# Patient Record
Sex: MALE | Race: WHITE
[De-identification: names, ages, dates, MRNs, and addresses within clinical notes are randomized per-mention and may not be internally consistent; named-entity substitution may affect disease eponyms.]

---

## 2017-04-03 ENCOUNTER — HOSPITAL ENCOUNTER (OUTPATIENT)
Dept: HOSPITAL 83 - RAD | Age: 77
Discharge: HOME | End: 2017-04-03
Attending: CHIROPRACTOR
Payer: MEDICARE

## 2017-04-03 DIAGNOSIS — M54.9: ICD-10-CM

## 2017-04-03 DIAGNOSIS — Z85.118: ICD-10-CM

## 2017-04-03 DIAGNOSIS — J44.9: ICD-10-CM

## 2017-04-03 DIAGNOSIS — M54.6: Primary | ICD-10-CM

## 2017-04-03 DIAGNOSIS — G89.29: ICD-10-CM

## 2017-04-03 DIAGNOSIS — M43.8X2: ICD-10-CM

## 2018-05-19 ENCOUNTER — HOSPITAL ENCOUNTER (EMERGENCY)
Dept: HOSPITAL 83 - ED | Age: 78
Discharge: HOME | End: 2018-05-19
Payer: COMMERCIAL

## 2018-05-19 VITALS — DIASTOLIC BLOOD PRESSURE: 91 MMHG

## 2018-05-19 VITALS — HEIGHT: 73.98 IN | BODY MASS INDEX: 21.17 KG/M2 | WEIGHT: 165 LBS

## 2018-05-19 DIAGNOSIS — Z79.899: ICD-10-CM
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
## 2018-05-19 DIAGNOSIS — Z79.82: ICD-10-CM

## 2018-05-19 DIAGNOSIS — Z88.1: ICD-10-CM
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
## 2018-05-19 DIAGNOSIS — Z88.8: ICD-10-CM
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
## 2018-05-19 DIAGNOSIS — S60.512A: ICD-10-CM

## 2018-05-19 DIAGNOSIS — Z90.89: ICD-10-CM

## 2018-05-19 DIAGNOSIS — S00.81XA: Primary | ICD-10-CM
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
## 2018-09-07 PROBLEM — I71.40 ABDOMINAL AORTIC ANEURYSM (AAA) WITHOUT RUPTURE: Status: ACTIVE | Noted: 2018-09-07

## 2018-09-07 PROBLEM — E78.2 MIXED HYPERLIPIDEMIA: Status: ACTIVE | Noted: 2018-09-07

## 2018-09-07 PROBLEM — I48.0 PAROXYSMAL ATRIAL FIBRILLATION (HCC): Status: ACTIVE | Noted: 2018-09-07

## 2018-09-07 PROBLEM — I10 ESSENTIAL HYPERTENSION: Status: ACTIVE | Noted: 2018-09-07

## 2018-09-07 PROBLEM — J44.9 COPD (CHRONIC OBSTRUCTIVE PULMONARY DISEASE) (HCC): Status: ACTIVE | Noted: 2018-09-07

## 2018-09-13 ENCOUNTER — OFFICE VISIT (OUTPATIENT)
Dept: CARDIOLOGY CLINIC | Age: 78
End: 2018-09-13
Payer: MEDICARE

## 2018-09-13 VITALS
SYSTOLIC BLOOD PRESSURE: 132 MMHG | RESPIRATION RATE: 18 BRPM | WEIGHT: 150.7 LBS | DIASTOLIC BLOOD PRESSURE: 82 MMHG | HEIGHT: 74 IN | BODY MASS INDEX: 19.34 KG/M2 | HEART RATE: 64 BPM

## 2018-09-13 DIAGNOSIS — J44.9 CHRONIC OBSTRUCTIVE PULMONARY DISEASE, UNSPECIFIED COPD TYPE (HCC): ICD-10-CM

## 2018-09-13 DIAGNOSIS — I10 ESSENTIAL HYPERTENSION: ICD-10-CM

## 2018-09-13 DIAGNOSIS — E78.2 MIXED HYPERLIPIDEMIA: ICD-10-CM

## 2018-09-13 DIAGNOSIS — I48.0 PAROXYSMAL ATRIAL FIBRILLATION (HCC): ICD-10-CM

## 2018-09-13 DIAGNOSIS — I45.2 RBBB (RIGHT BUNDLE BRANCH BLOCK WITH LEFT ANTERIOR FASCICULAR BLOCK): ICD-10-CM

## 2018-09-13 DIAGNOSIS — I71.40 ABDOMINAL AORTIC ANEURYSM (AAA) WITHOUT RUPTURE: ICD-10-CM

## 2018-09-13 PROCEDURE — 93000 ELECTROCARDIOGRAM COMPLETE: CPT | Performed by: INTERNAL MEDICINE

## 2018-09-13 PROCEDURE — 99205 OFFICE O/P NEW HI 60 MIN: CPT | Performed by: INTERNAL MEDICINE

## 2018-09-13 PROCEDURE — 1101F PT FALLS ASSESS-DOCD LE1/YR: CPT | Performed by: INTERNAL MEDICINE

## 2018-09-13 PROCEDURE — G8427 DOCREV CUR MEDS BY ELIG CLIN: HCPCS | Performed by: INTERNAL MEDICINE

## 2018-09-13 PROCEDURE — 3023F SPIROM DOC REV: CPT | Performed by: INTERNAL MEDICINE

## 2018-09-13 PROCEDURE — 1036F TOBACCO NON-USER: CPT | Performed by: INTERNAL MEDICINE

## 2018-09-13 PROCEDURE — G8926 SPIRO NO PERF OR DOC: HCPCS | Performed by: INTERNAL MEDICINE

## 2018-09-13 PROCEDURE — 4040F PNEUMOC VAC/ADMIN/RCVD: CPT | Performed by: INTERNAL MEDICINE

## 2018-09-13 PROCEDURE — 1123F ACP DISCUSS/DSCN MKR DOCD: CPT | Performed by: INTERNAL MEDICINE

## 2018-09-13 PROCEDURE — G8420 CALC BMI NORM PARAMETERS: HCPCS | Performed by: INTERNAL MEDICINE

## 2018-09-13 RX ORDER — METHYLPREDNISOLONE 4 MG/1
TABLET ORAL
Refills: 2 | Status: ON HOLD | COMMUNITY
Start: 2018-09-06 | End: 2019-08-14 | Stop reason: HOSPADM

## 2018-09-13 RX ORDER — MELOXICAM 15 MG/1
15 TABLET ORAL DAILY
COMMUNITY
End: 2019-12-23

## 2018-09-13 RX ORDER — ALENDRONATE SODIUM 70 MG/1
70 TABLET ORAL
Refills: 1 | COMMUNITY
Start: 2018-08-18 | End: 2022-01-01

## 2018-09-13 RX ORDER — AZITHROMYCIN 250 MG/1
TABLET, FILM COATED ORAL
Refills: 2 | COMMUNITY
Start: 2018-09-06 | End: 2019-02-20

## 2018-09-13 RX ORDER — IPRATROPIUM BROMIDE AND ALBUTEROL SULFATE 2.5; .5 MG/3ML; MG/3ML
SOLUTION RESPIRATORY (INHALATION)
Refills: 5 | COMMUNITY
Start: 2018-09-05 | End: 2019-12-23

## 2018-09-13 RX ORDER — SIMVASTATIN 20 MG
20 TABLET ORAL NIGHTLY
Status: ON HOLD | COMMUNITY
End: 2019-08-14 | Stop reason: HOSPADM

## 2018-09-13 RX ORDER — ALBUTEROL SULFATE 2.5 MG/3ML
2.5 SOLUTION RESPIRATORY (INHALATION) EVERY 6 HOURS PRN
COMMUNITY
End: 2019-09-19

## 2018-09-13 RX ORDER — BUDESONIDE 0.5 MG/2ML
INHALANT ORAL
Refills: 5 | COMMUNITY
Start: 2018-09-05 | End: 2019-09-19

## 2018-09-18 ENCOUNTER — HOSPITAL ENCOUNTER (OUTPATIENT)
Age: 78
Discharge: HOME | End: 2018-09-18
Payer: MEDICARE

## 2018-09-18 PROCEDURE — 86606 ASPERGILLUS ANTIBODY: CPT

## 2018-09-21 ENCOUNTER — HOSPITAL ENCOUNTER (OUTPATIENT)
Dept: CARDIOLOGY | Age: 78
Discharge: HOME | End: 2018-09-21
Payer: MEDICARE

## 2018-09-21 DIAGNOSIS — I45.2 RBBB (RIGHT BUNDLE BRANCH BLOCK WITH LEFT ANTERIOR FASCICULAR BLOCK): ICD-10-CM

## 2018-09-21 DIAGNOSIS — I10 ESSENTIAL HYPERTENSION: ICD-10-CM

## 2018-09-21 DIAGNOSIS — I48.0 PAROXYSMAL ATRIAL FIBRILLATION (HCC): ICD-10-CM

## 2018-09-21 DIAGNOSIS — J44.9 CHRONIC OBSTRUCTIVE PULMONARY DISEASE, UNSPECIFIED COPD TYPE (HCC): ICD-10-CM

## 2018-09-21 LAB
LV EF: 55 %
LVEF MODALITY: NORMAL

## 2018-09-21 PROCEDURE — 93306 TTE W/DOPPLER COMPLETE: CPT

## 2018-09-24 ENCOUNTER — TELEPHONE (OUTPATIENT)
Dept: CARDIOLOGY CLINIC | Age: 78
End: 2018-09-24

## 2018-09-24 DIAGNOSIS — R07.2 PRECORDIAL PAIN: Primary | ICD-10-CM

## 2018-09-28 ENCOUNTER — HOSPITAL ENCOUNTER (OUTPATIENT)
Dept: CARDIOLOGY | Age: 78
Discharge: HOME | End: 2018-09-28
Payer: MEDICARE

## 2018-09-28 ENCOUNTER — TELEPHONE (OUTPATIENT)
Dept: CARDIOLOGY CLINIC | Age: 78
End: 2018-09-28

## 2018-09-28 VITALS
BODY MASS INDEX: 18.87 KG/M2 | SYSTOLIC BLOOD PRESSURE: 140 MMHG | WEIGHT: 147 LBS | HEIGHT: 74 IN | HEART RATE: 74 BPM | DIASTOLIC BLOOD PRESSURE: 74 MMHG

## 2018-09-28 DIAGNOSIS — R07.9 CHEST PAIN, UNSPECIFIED TYPE: Primary | ICD-10-CM

## 2018-09-28 DIAGNOSIS — I25.10 CORONARY ARTERY DISEASE INVOLVING NATIVE CORONARY ARTERY OF NATIVE HEART WITHOUT ANGINA PECTORIS: ICD-10-CM

## 2018-09-28 DIAGNOSIS — R07.2 PRECORDIAL PAIN: ICD-10-CM

## 2018-09-28 PROCEDURE — A9500 TC99M SESTAMIBI: HCPCS | Performed by: INTERNAL MEDICINE

## 2018-09-28 PROCEDURE — 2580000003 HC RX 258: Performed by: INTERNAL MEDICINE

## 2018-09-28 PROCEDURE — 93017 CV STRESS TEST TRACING ONLY: CPT

## 2018-09-28 PROCEDURE — 3430000000 HC RX DIAGNOSTIC RADIOPHARMACEUTICAL: Performed by: INTERNAL MEDICINE

## 2018-09-28 PROCEDURE — 78452 HT MUSCLE IMAGE SPECT MULT: CPT

## 2018-09-28 PROCEDURE — 6360000002 HC RX W HCPCS: Performed by: INTERNAL MEDICINE

## 2018-09-28 RX ORDER — SODIUM CHLORIDE 0.9 % (FLUSH) 0.9 %
10 SYRINGE (ML) INJECTION PRN
Status: DISCONTINUED | OUTPATIENT
Start: 2018-09-28 | End: 2018-09-29 | Stop reason: HOSPADM

## 2018-09-28 RX ADMIN — Medication 28.9 MILLICURIE: at 11:10

## 2018-09-28 RX ADMIN — Medication 10.2 MILLICURIE: at 09:21

## 2018-09-28 RX ADMIN — REGADENOSON 0.4 MG: 0.08 INJECTION, SOLUTION INTRAVENOUS at 11:10

## 2018-09-28 RX ADMIN — Medication 10 ML: at 11:10

## 2018-09-28 RX ADMIN — Medication 10 ML: at 09:21

## 2018-09-28 RX ADMIN — Medication 10 ML: at 11:11

## 2018-09-28 NOTE — TELEPHONE ENCOUNTER
Patients wife notified and instructed on stress test results and follow-up as stated by Dr. Roya Feliciano

## 2018-09-28 NOTE — PROCEDURES
[address],[place] [address] [address]  [phone]                Pharmacologic Stress Nuclear Gated SPECT Study    Name: [facility] Account Number: [de-identified]    :  1940          Sex: male         Date of Study:  2018    Height: 6' 2\" (188 cm)         Weight: 147 lb (66.7 kg)     Ordering Provider: Gary Bailon          PCP: Maxine Sandoval MD      Cardiologist: Gary Bailon             Interpreting Physician: Gary Bailon  _________________________________________________________________________________    Indication:   Evaluation of extent and severity of coronary artery disease    Clinical History:   Patient has prior history of coronary artery disease. Procedure:   Pharmacologic stress testing was performed with regadenoson 0.4 mg for 15 seconds. The heart rate was 66 at baseline and dimitri to 92 beats during the infusion. This corresponds to 65% of maximum predicted heart rate. The blood pressure at baseline was 140/80 and blood pressure at the end of infusion was 140/80. Blood pressure response was normal during the stress procedure. The patient experienced mild shortness of breath during the infusion, which resolved. ECG during the infusion did not change. IMAGING: Myocardial perfusion imaging was performed at rest 30-35 minutes following the intravenous injection of 10.2 mCi of (Tc-Sestamibi) followed by 10 ml of Normal Saline. As per infusion protocol, the patient was injected intravenously with 28.9 mCi of (Tc-Sestamibi) followed by 10 ml of Normal Saline. Gated post-stress tomographic imaging was performed 20-25 minutes after stress. FINDINGS: The overall quality of the study was good. Left ventricular cavity size was noted to be normal.    Rotational analog analysis demonstrated soft tissue diaphragmatic attenuation.     The gated SPECT stress imaging in the short, vertical long, and horizontal long axis demonstrated normal homogeneous tracer distribution throughout the myocardium, apart from artifact described above. Gated SPECT left ventricular ejection fraction was calculated to be 63%, with normal myocardial thickening and wall motion. Impression:    1. ECG during the infusion did not change. 2. The myocardial perfusion imaging was normal with attenuation artifact. 3. Overall left ventricular systolic function was normal without regional wall motion abnormalities. 4. Low risk general pharmacologic nuclear stress test.    Thank you for sending your patient to this [employer].      Electronically signed by Deanna Nicholson MD on 9/28/18 at 12:06 PM

## 2018-09-29 LAB
Lab: NORMAL
REPORT: NORMAL
THIS TEST SENT TO: NORMAL

## 2018-11-12 ENCOUNTER — HOSPITAL ENCOUNTER (OUTPATIENT)
Age: 78
Discharge: HOME | End: 2018-11-12
Payer: MEDICARE

## 2018-11-12 PROCEDURE — 87385 HISTOPLASMA CAPSUL AG IA: CPT

## 2018-11-13 ENCOUNTER — HOSPITAL ENCOUNTER (OUTPATIENT)
Age: 78
Discharge: HOME | End: 2018-11-13
Payer: MEDICARE

## 2018-11-13 LAB
ALBUMIN SERPL-MCNC: 4 G/DL (ref 3.5–5.2)
ALP BLD-CCNC: 125 U/L (ref 40–129)
ALT SERPL-CCNC: 25 U/L (ref 0–40)
ANION GAP SERPL CALCULATED.3IONS-SCNC: 10 MMOL/L (ref 7–16)
AST SERPL-CCNC: 22 U/L (ref 0–39)
BASOPHILS ABSOLUTE: 0.1 E9/L (ref 0–0.2)
BASOPHILS RELATIVE PERCENT: 0.8 % (ref 0–2)
BILIRUB SERPL-MCNC: 0.4 MG/DL (ref 0–1.2)
BUN BLDV-MCNC: 26 MG/DL (ref 8–23)
C-REACTIVE PROTEIN: 4.7 MG/DL (ref 0–0.4)
CALCIUM SERPL-MCNC: 9.3 MG/DL (ref 8.6–10.2)
CHLORIDE BLD-SCNC: 110 MMOL/L (ref 98–107)
CO2: 27 MMOL/L (ref 22–29)
CREAT SERPL-MCNC: 1.1 MG/DL (ref 0.7–1.2)
EOSINOPHILS ABSOLUTE: 0.4 E9/L (ref 0.05–0.5)
EOSINOPHILS RELATIVE PERCENT: 3.3 % (ref 0–6)
GFR AFRICAN AMERICAN: >60
GFR NON-AFRICAN AMERICAN: >60 ML/MIN/1.73
GLUCOSE BLD-MCNC: 97 MG/DL (ref 74–99)
HCT VFR BLD CALC: 41.7 % (ref 37–54)
HEMOGLOBIN: 13 G/DL (ref 12.5–16.5)
IMMATURE GRANULOCYTES #: 0.05 E9/L
IMMATURE GRANULOCYTES %: 0.4 % (ref 0–5)
LYMPHOCYTES ABSOLUTE: 1.08 E9/L (ref 1.5–4)
LYMPHOCYTES RELATIVE PERCENT: 9 % (ref 20–42)
MCH RBC QN AUTO: 28.4 PG (ref 26–35)
MCHC RBC AUTO-ENTMCNC: 31.2 % (ref 32–34.5)
MCV RBC AUTO: 91 FL (ref 80–99.9)
MONOCYTES ABSOLUTE: 0.72 E9/L (ref 0.1–0.95)
MONOCYTES RELATIVE PERCENT: 6 % (ref 2–12)
NEUTROPHILS ABSOLUTE: 9.64 E9/L (ref 1.8–7.3)
NEUTROPHILS RELATIVE PERCENT: 80.5 % (ref 43–80)
PDW BLD-RTO: 14.5 FL (ref 11.5–15)
PLATELET # BLD: 284 E9/L (ref 130–450)
PMV BLD AUTO: 10.7 FL (ref 7–12)
POTASSIUM SERPL-SCNC: 4.7 MMOL/L (ref 3.5–5)
RBC # BLD: 4.58 E12/L (ref 3.8–5.8)
SEDIMENTATION RATE, ERYTHROCYTE: 61 MM/HR (ref 0–15)
SODIUM BLD-SCNC: 147 MMOL/L (ref 132–146)
TOTAL PROTEIN: 7.4 G/DL (ref 6.4–8.3)
WBC # BLD: 12 E9/L (ref 4.5–11.5)

## 2018-11-13 PROCEDURE — 36415 COLL VENOUS BLD VENIPUNCTURE: CPT

## 2018-11-13 PROCEDURE — 82784 ASSAY IGA/IGD/IGG/IGM EACH: CPT

## 2018-11-13 PROCEDURE — 85025 COMPLETE CBC W/AUTO DIFF WBC: CPT

## 2018-11-13 PROCEDURE — 87305 ASPERGILLUS AG IA: CPT

## 2018-11-13 PROCEDURE — 86481 TB AG RESPONSE T-CELL SUSP: CPT

## 2018-11-13 PROCEDURE — 82785 ASSAY OF IGE: CPT

## 2018-11-13 PROCEDURE — 87449 NOS EACH ORGANISM AG IA: CPT

## 2018-11-13 PROCEDURE — 86140 C-REACTIVE PROTEIN: CPT

## 2018-11-13 PROCEDURE — 80053 COMPREHEN METABOLIC PANEL: CPT

## 2018-11-13 PROCEDURE — 85651 RBC SED RATE NONAUTOMATED: CPT

## 2018-11-13 PROCEDURE — 82787 IGG 1 2 3 OR 4 EACH: CPT

## 2018-11-14 LAB
IGA: 293 MG/DL (ref 70–400)
IGG: 1201 MG/DL (ref 700–1600)
IGM: 108 MG/DL (ref 40–230)

## 2018-11-15 LAB
(1,3)-BETA-D-GLUCAN (FUNGITELL) INTERPRETATION: NEGATIVE
(1,3)-BETA-D-GLUCAN (FUNGITELL): <31 PG/ML

## 2018-11-16 LAB
IGG 1: 645 MG/DL (ref 240–1118)
IGG 2: 403 MG/DL (ref 124–549)
IGG 3: 169 MG/DL (ref 21–134)
IGG 4: 71 MG/DL (ref 1–123)

## 2018-11-17 LAB
ASPERGILLUS GALACTO AG: POSITIVE
ASPERGILLUS GALACTO INDEX: 0.73
IGE: 121 KU/L

## 2018-11-21 LAB
Lab: NORMAL
REPORT: NORMAL
THIS TEST SENT TO: NORMAL

## 2018-12-10 LAB
COMMENT: NORMAL
REPORT: NORMAL

## 2019-01-02 ENCOUNTER — HOSPITAL ENCOUNTER (OUTPATIENT)
Dept: GENERAL RADIOLOGY | Age: 79
Discharge: HOME | End: 2019-01-04
Payer: MEDICARE

## 2019-01-02 ENCOUNTER — HOSPITAL ENCOUNTER (OUTPATIENT)
Age: 79
Discharge: HOME | End: 2019-01-04
Payer: MEDICARE

## 2019-01-02 DIAGNOSIS — B49 FUNGAL INFECTION OF LUNG: ICD-10-CM

## 2019-01-02 PROCEDURE — 71046 X-RAY EXAM CHEST 2 VIEWS: CPT

## 2019-02-20 RX ORDER — TAMSULOSIN HYDROCHLORIDE 0.4 MG/1
0.4 CAPSULE ORAL NIGHTLY
COMMUNITY

## 2019-02-22 ENCOUNTER — HOSPITAL ENCOUNTER (OUTPATIENT)
Age: 79
Setting detail: OUTPATIENT SURGERY
Discharge: HOME | End: 2019-02-22
Attending: INTERNAL MEDICINE | Admitting: INTERNAL MEDICINE
Payer: MEDICARE

## 2019-02-22 ENCOUNTER — ANESTHESIA (OUTPATIENT)
Dept: ENDOSCOPY | Age: 79
End: 2019-02-22
Payer: MEDICARE

## 2019-02-22 ENCOUNTER — ANESTHESIA EVENT (OUTPATIENT)
Dept: ENDOSCOPY | Age: 79
End: 2019-02-22
Payer: MEDICARE

## 2019-02-22 VITALS
TEMPERATURE: 97.7 F | OXYGEN SATURATION: 92 % | SYSTOLIC BLOOD PRESSURE: 150 MMHG | BODY MASS INDEX: 17.58 KG/M2 | HEIGHT: 74 IN | DIASTOLIC BLOOD PRESSURE: 88 MMHG | HEART RATE: 80 BPM | RESPIRATION RATE: 18 BRPM | WEIGHT: 137 LBS

## 2019-02-22 VITALS — SYSTOLIC BLOOD PRESSURE: 143 MMHG | DIASTOLIC BLOOD PRESSURE: 82 MMHG | OXYGEN SATURATION: 92 %

## 2019-02-22 PROCEDURE — 87077 CULTURE AEROBIC IDENTIFY: CPT

## 2019-02-22 PROCEDURE — 3700000000 HC ANESTHESIA ATTENDED CARE: Performed by: INTERNAL MEDICINE

## 2019-02-22 PROCEDURE — 2580000003 HC RX 258: Performed by: NURSE ANESTHETIST, CERTIFIED REGISTERED

## 2019-02-22 PROCEDURE — 7100000010 HC PHASE II RECOVERY - FIRST 15 MIN: Performed by: INTERNAL MEDICINE

## 2019-02-22 PROCEDURE — 87116 MYCOBACTERIA CULTURE: CPT

## 2019-02-22 PROCEDURE — 3609027000 HC BRONCHOSCOPY: Performed by: INTERNAL MEDICINE

## 2019-02-22 PROCEDURE — 87070 CULTURE OTHR SPECIMN AEROBIC: CPT

## 2019-02-22 PROCEDURE — 87102 FUNGUS ISOLATION CULTURE: CPT

## 2019-02-22 PROCEDURE — 7100000011 HC PHASE II RECOVERY - ADDTL 15 MIN: Performed by: INTERNAL MEDICINE

## 2019-02-22 PROCEDURE — 87186 SC STD MICRODIL/AGAR DIL: CPT

## 2019-02-22 PROCEDURE — 6370000000 HC RX 637 (ALT 250 FOR IP): Performed by: INTERNAL MEDICINE

## 2019-02-22 PROCEDURE — 87015 SPECIMEN INFECT AGNT CONCNTJ: CPT

## 2019-02-22 PROCEDURE — 87205 SMEAR GRAM STAIN: CPT

## 2019-02-22 PROCEDURE — 6360000002 HC RX W HCPCS: Performed by: NURSE ANESTHETIST, CERTIFIED REGISTERED

## 2019-02-22 PROCEDURE — 87206 SMEAR FLUORESCENT/ACID STAI: CPT

## 2019-02-22 PROCEDURE — 3700000001 HC ADD 15 MINUTES (ANESTHESIA): Performed by: INTERNAL MEDICINE

## 2019-02-22 RX ORDER — PROPOFOL 10 MG/ML
INJECTION, EMULSION INTRAVENOUS PRN
Status: DISCONTINUED | OUTPATIENT
Start: 2019-02-22 | End: 2019-02-22 | Stop reason: SDUPTHER

## 2019-02-22 RX ORDER — SODIUM CHLORIDE 9 MG/ML
INJECTION, SOLUTION INTRAVENOUS CONTINUOUS PRN
Status: DISCONTINUED | OUTPATIENT
Start: 2019-02-22 | End: 2019-02-22 | Stop reason: SDUPTHER

## 2019-02-22 RX ADMIN — SODIUM CHLORIDE: 9 INJECTION, SOLUTION INTRAVENOUS at 10:45

## 2019-02-22 RX ADMIN — PROPOFOL 150 MG: 10 INJECTION, EMULSION INTRAVENOUS at 11:11

## 2019-02-22 ASSESSMENT — ENCOUNTER SYMPTOMS: SHORTNESS OF BREATH: 1

## 2019-02-22 ASSESSMENT — PAIN SCALES - GENERAL: PAINLEVEL_OUTOF10: 0

## 2019-02-22 ASSESSMENT — PAIN - FUNCTIONAL ASSESSMENT: PAIN_FUNCTIONAL_ASSESSMENT: 0-10

## 2019-02-23 LAB — GRAM STAIN ORDERABLE: NORMAL

## 2019-02-27 LAB
CULTURE, RESPIRATORY: ABNORMAL
CULTURE, RESPIRATORY: ABNORMAL
ORGANISM: ABNORMAL
SMEAR, RESPIRATORY: ABNORMAL

## 2019-03-06 ENCOUNTER — HOSPITAL ENCOUNTER (OUTPATIENT)
Dept: CT IMAGING | Age: 79
Discharge: HOME | End: 2019-03-08
Payer: MEDICARE

## 2019-03-06 DIAGNOSIS — B44.9 ASPERGILLOMA (HCC): ICD-10-CM

## 2019-03-06 PROCEDURE — 71250 CT THORAX DX C-: CPT

## 2019-04-01 LAB
FUNGUS (MYCOLOGY) CULTURE: NORMAL
FUNGUS STAIN: NORMAL

## 2019-04-02 PROBLEM — B44.9 ASPERGILLOSIS, WITH PNEUMONIA (HCC): Status: ACTIVE | Noted: 2019-04-02

## 2019-04-16 LAB
AFB CULTURE (MYCOBACTERIA): NORMAL
AFB SMEAR: NORMAL

## 2019-04-29 ENCOUNTER — HOSPITAL ENCOUNTER (OUTPATIENT)
Age: 79
Discharge: HOME | End: 2019-04-29
Payer: MEDICARE

## 2019-04-29 LAB
ALBUMIN SERPL-MCNC: 4.1 G/DL (ref 3.5–5.2)
ALP BLD-CCNC: 140 U/L (ref 40–129)
ALT SERPL-CCNC: 17 U/L (ref 0–40)
ANION GAP SERPL CALCULATED.3IONS-SCNC: 11 MMOL/L (ref 7–16)
AST SERPL-CCNC: 25 U/L (ref 0–39)
BASOPHILS ABSOLUTE: 0.06 E9/L (ref 0–0.2)
BASOPHILS RELATIVE PERCENT: 0.5 % (ref 0–2)
BILIRUB SERPL-MCNC: 0.2 MG/DL (ref 0–1.2)
BUN BLDV-MCNC: 24 MG/DL (ref 8–23)
C-REACTIVE PROTEIN: 5.6 MG/DL (ref 0–0.4)
CALCIUM SERPL-MCNC: 9.2 MG/DL (ref 8.6–10.2)
CHLORIDE BLD-SCNC: 110 MMOL/L (ref 98–107)
CO2: 25 MMOL/L (ref 22–29)
CREAT SERPL-MCNC: 1.1 MG/DL (ref 0.7–1.2)
EOSINOPHILS ABSOLUTE: 0.38 E9/L (ref 0.05–0.5)
EOSINOPHILS RELATIVE PERCENT: 3.3 % (ref 0–6)
GFR AFRICAN AMERICAN: >60
GFR NON-AFRICAN AMERICAN: >60 ML/MIN/1.73
GLUCOSE BLD-MCNC: 85 MG/DL (ref 74–99)
HCT VFR BLD CALC: 42.2 % (ref 37–54)
HEMOGLOBIN: 13.2 G/DL (ref 12.5–16.5)
IMMATURE GRANULOCYTES #: 0.08 E9/L
IMMATURE GRANULOCYTES %: 0.7 % (ref 0–5)
LYMPHOCYTES ABSOLUTE: 1.54 E9/L (ref 1.5–4)
LYMPHOCYTES RELATIVE PERCENT: 13.3 % (ref 20–42)
MCH RBC QN AUTO: 29.1 PG (ref 26–35)
MCHC RBC AUTO-ENTMCNC: 31.3 % (ref 32–34.5)
MCV RBC AUTO: 93.2 FL (ref 80–99.9)
MONOCYTES ABSOLUTE: 1.01 E9/L (ref 0.1–0.95)
MONOCYTES RELATIVE PERCENT: 8.7 % (ref 2–12)
NEUTROPHILS ABSOLUTE: 8.52 E9/L (ref 1.8–7.3)
NEUTROPHILS RELATIVE PERCENT: 73.5 % (ref 43–80)
PDW BLD-RTO: 15.9 FL (ref 11.5–15)
PLATELET # BLD: 324 E9/L (ref 130–450)
PMV BLD AUTO: 10.5 FL (ref 7–12)
POTASSIUM SERPL-SCNC: 4.4 MMOL/L (ref 3.5–5)
RBC # BLD: 4.53 E12/L (ref 3.8–5.8)
SEDIMENTATION RATE, ERYTHROCYTE: 30 MM/HR (ref 0–15)
SODIUM BLD-SCNC: 146 MMOL/L (ref 132–146)
TOTAL PROTEIN: 7.1 G/DL (ref 6.4–8.3)
WBC # BLD: 11.6 E9/L (ref 4.5–11.5)

## 2019-04-29 PROCEDURE — 87449 NOS EACH ORGANISM AG IA: CPT

## 2019-04-29 PROCEDURE — 85025 COMPLETE CBC W/AUTO DIFF WBC: CPT

## 2019-04-29 PROCEDURE — 36415 COLL VENOUS BLD VENIPUNCTURE: CPT

## 2019-04-29 PROCEDURE — 80053 COMPREHEN METABOLIC PANEL: CPT

## 2019-04-29 PROCEDURE — 86140 C-REACTIVE PROTEIN: CPT

## 2019-04-29 PROCEDURE — 87305 ASPERGILLUS AG IA: CPT

## 2019-04-29 PROCEDURE — 80299 QUANTITATIVE ASSAY DRUG: CPT

## 2019-04-29 PROCEDURE — 85651 RBC SED RATE NONAUTOMATED: CPT

## 2019-05-01 LAB
(1,3)-BETA-D-GLUCAN (FUNGITELL) INTERPRETATION: NEGATIVE
(1,3)-BETA-D-GLUCAN (FUNGITELL): <31 PG/ML
ASPERGILLUS GALACTO AG: NEGATIVE
ASPERGILLUS GALACTO INDEX: 0.04

## 2019-05-03 LAB
Lab: NORMAL
REPORT: NORMAL
THIS TEST SENT TO: NORMAL

## 2019-05-04 ENCOUNTER — APPOINTMENT (OUTPATIENT)
Dept: GENERAL RADIOLOGY | Age: 79
End: 2019-05-04
Payer: MEDICARE

## 2019-05-04 ENCOUNTER — APPOINTMENT (OUTPATIENT)
Dept: CT IMAGING | Age: 79
End: 2019-05-04
Payer: MEDICARE

## 2019-05-04 ENCOUNTER — HOSPITAL ENCOUNTER (EMERGENCY)
Age: 79
Discharge: HOME | End: 2019-05-04
Attending: EMERGENCY MEDICINE
Payer: MEDICARE

## 2019-05-04 VITALS
HEIGHT: 74 IN | HEART RATE: 82 BPM | WEIGHT: 141 LBS | OXYGEN SATURATION: 94 % | TEMPERATURE: 97.1 F | DIASTOLIC BLOOD PRESSURE: 84 MMHG | BODY MASS INDEX: 18.1 KG/M2 | RESPIRATION RATE: 18 BRPM | SYSTOLIC BLOOD PRESSURE: 158 MMHG

## 2019-05-04 DIAGNOSIS — S39.012A BACK STRAIN, INITIAL ENCOUNTER: Primary | ICD-10-CM

## 2019-05-04 LAB
ANION GAP SERPL CALCULATED.3IONS-SCNC: 11 MMOL/L (ref 7–16)
ANION GAP SERPL CALCULATED.3IONS-SCNC: 11 MMOL/L (ref 7–16)
APTT: 24.4 SEC (ref 24.5–35.1)
BASOPHILS ABSOLUTE: 0.03 E9/L (ref 0–0.2)
BASOPHILS RELATIVE PERCENT: 0.2 % (ref 0–2)
BUN BLDV-MCNC: 24 MG/DL (ref 8–23)
BUN BLDV-MCNC: 26 MG/DL (ref 8–23)
CALCIUM SERPL-MCNC: 8.6 MG/DL (ref 8.6–10.2)
CALCIUM SERPL-MCNC: 8.8 MG/DL (ref 8.6–10.2)
CHLORIDE BLD-SCNC: 107 MMOL/L (ref 98–107)
CHLORIDE BLD-SCNC: 109 MMOL/L (ref 98–107)
CO2: 24 MMOL/L (ref 22–29)
CO2: 25 MMOL/L (ref 22–29)
CREAT SERPL-MCNC: 0.9 MG/DL (ref 0.7–1.2)
CREAT SERPL-MCNC: 0.9 MG/DL (ref 0.7–1.2)
EKG ATRIAL RATE: 63 BPM
EKG P AXIS: 62 DEGREES
EKG P-R INTERVAL: 152 MS
EKG Q-T INTERVAL: 450 MS
EKG QRS DURATION: 136 MS
EKG QTC CALCULATION (BAZETT): 460 MS
EKG R AXIS: -81 DEGREES
EKG T AXIS: 17 DEGREES
EKG VENTRICULAR RATE: 63 BPM
EOSINOPHILS ABSOLUTE: 0 E9/L (ref 0.05–0.5)
EOSINOPHILS RELATIVE PERCENT: 0 % (ref 0–6)
GFR AFRICAN AMERICAN: >60
GFR AFRICAN AMERICAN: >60
GFR NON-AFRICAN AMERICAN: >60 ML/MIN/1.73
GFR NON-AFRICAN AMERICAN: >60 ML/MIN/1.73
GLUCOSE BLD-MCNC: 106 MG/DL (ref 74–99)
GLUCOSE BLD-MCNC: 114 MG/DL (ref 74–99)
HCT VFR BLD CALC: 38.1 % (ref 37–54)
HEMOGLOBIN: 11.6 G/DL (ref 12.5–16.5)
IMMATURE GRANULOCYTES #: 0.19 E9/L
IMMATURE GRANULOCYTES %: 1 % (ref 0–5)
INR BLD: 1.1
LACTIC ACID, SEPSIS: 1.1 MMOL/L (ref 0.5–1.9)
LYMPHOCYTES ABSOLUTE: 0.97 E9/L (ref 1.5–4)
LYMPHOCYTES RELATIVE PERCENT: 5 % (ref 20–42)
MCH RBC QN AUTO: 28.4 PG (ref 26–35)
MCHC RBC AUTO-ENTMCNC: 30.4 % (ref 32–34.5)
MCV RBC AUTO: 93.4 FL (ref 80–99.9)
MONOCYTES ABSOLUTE: 0.68 E9/L (ref 0.1–0.95)
MONOCYTES RELATIVE PERCENT: 3.5 % (ref 2–12)
NEUTROPHILS ABSOLUTE: 17.72 E9/L (ref 1.8–7.3)
NEUTROPHILS RELATIVE PERCENT: 90.3 % (ref 43–80)
PDW BLD-RTO: 15.8 FL (ref 11.5–15)
PLATELET # BLD: 325 E9/L (ref 130–450)
PMV BLD AUTO: 11 FL (ref 7–12)
POTASSIUM REFLEX MAGNESIUM: 6 MMOL/L (ref 3.5–5)
POTASSIUM SERPL-SCNC: 4.1 MMOL/L (ref 3.5–5)
PRO-BNP: 2142 PG/ML (ref 0–450)
PROTHROMBIN TIME: 12 SEC (ref 9.3–12.4)
RBC # BLD: 4.08 E12/L (ref 3.8–5.8)
SODIUM BLD-SCNC: 143 MMOL/L (ref 132–146)
SODIUM BLD-SCNC: 144 MMOL/L (ref 132–146)
TROPONIN: <0.01 NG/ML (ref 0–0.03)
WBC # BLD: 19.6 E9/L (ref 4.5–11.5)

## 2019-05-04 PROCEDURE — 2580000003 HC RX 258: Performed by: RADIOLOGY

## 2019-05-04 PROCEDURE — 6370000000 HC RX 637 (ALT 250 FOR IP): Performed by: EMERGENCY MEDICINE

## 2019-05-04 PROCEDURE — 6360000004 HC RX CONTRAST MEDICATION: Performed by: RADIOLOGY

## 2019-05-04 PROCEDURE — 85730 THROMBOPLASTIN TIME PARTIAL: CPT

## 2019-05-04 PROCEDURE — 85610 PROTHROMBIN TIME: CPT

## 2019-05-04 PROCEDURE — 83605 ASSAY OF LACTIC ACID: CPT

## 2019-05-04 PROCEDURE — 85025 COMPLETE CBC W/AUTO DIFF WBC: CPT

## 2019-05-04 PROCEDURE — 93005 ELECTROCARDIOGRAM TRACING: CPT | Performed by: EMERGENCY MEDICINE

## 2019-05-04 PROCEDURE — 71275 CT ANGIOGRAPHY CHEST: CPT

## 2019-05-04 PROCEDURE — 6360000002 HC RX W HCPCS: Performed by: STUDENT IN AN ORGANIZED HEALTH CARE EDUCATION/TRAINING PROGRAM

## 2019-05-04 PROCEDURE — 87040 BLOOD CULTURE FOR BACTERIA: CPT

## 2019-05-04 PROCEDURE — 71046 X-RAY EXAM CHEST 2 VIEWS: CPT

## 2019-05-04 PROCEDURE — 80048 BASIC METABOLIC PNL TOTAL CA: CPT

## 2019-05-04 PROCEDURE — 83880 ASSAY OF NATRIURETIC PEPTIDE: CPT

## 2019-05-04 PROCEDURE — 93010 ELECTROCARDIOGRAM REPORT: CPT | Performed by: INTERNAL MEDICINE

## 2019-05-04 PROCEDURE — 96374 THER/PROPH/DIAG INJ IV PUSH: CPT

## 2019-05-04 PROCEDURE — 99285 EMERGENCY DEPT VISIT HI MDM: CPT

## 2019-05-04 PROCEDURE — 84484 ASSAY OF TROPONIN QUANT: CPT

## 2019-05-04 RX ORDER — HYDROCODONE BITARTRATE AND ACETAMINOPHEN 5; 325 MG/1; MG/1
1 TABLET ORAL EVERY 6 HOURS PRN
Qty: 12 TABLET | Refills: 0 | Status: SHIPPED | OUTPATIENT
Start: 2019-05-04 | End: 2019-05-07

## 2019-05-04 RX ORDER — ACETAMINOPHEN 500 MG
1000 TABLET ORAL ONCE
Status: COMPLETED | OUTPATIENT
Start: 2019-05-04 | End: 2019-05-04

## 2019-05-04 RX ORDER — FENTANYL CITRATE 50 UG/ML
12.5 INJECTION, SOLUTION INTRAMUSCULAR; INTRAVENOUS ONCE
Status: COMPLETED | OUTPATIENT
Start: 2019-05-04 | End: 2019-05-04

## 2019-05-04 RX ORDER — SODIUM CHLORIDE 0.9 % (FLUSH) 0.9 %
10 SYRINGE (ML) INJECTION PRN
Status: COMPLETED | OUTPATIENT
Start: 2019-05-04 | End: 2019-05-04

## 2019-05-04 RX ADMIN — Medication 10 ML: at 17:01

## 2019-05-04 RX ADMIN — IOPAMIDOL 65 ML: 755 INJECTION, SOLUTION INTRAVENOUS at 17:05

## 2019-05-04 RX ADMIN — ACETAMINOPHEN 1000 MG: 500 TABLET ORAL at 16:31

## 2019-05-04 RX ADMIN — FENTANYL CITRATE 12.5 MCG: 50 INJECTION, SOLUTION INTRAMUSCULAR; INTRAVENOUS at 18:07

## 2019-05-04 ASSESSMENT — PAIN DESCRIPTION - PAIN TYPE
TYPE: ACUTE PAIN
TYPE: ACUTE PAIN

## 2019-05-04 ASSESSMENT — PAIN DESCRIPTION - FREQUENCY
FREQUENCY: CONTINUOUS

## 2019-05-04 ASSESSMENT — ENCOUNTER SYMPTOMS
EYE REDNESS: 0
SHORTNESS OF BREATH: 0
BACK PAIN: 1
WHEEZING: 0
CONSTIPATION: 0
SINUS PRESSURE: 0
PHOTOPHOBIA: 0
ABDOMINAL PAIN: 0
DIARRHEA: 0
ABDOMINAL DISTENTION: 0
VOMITING: 0
COUGH: 0
BLOOD IN STOOL: 0
TROUBLE SWALLOWING: 0
SORE THROAT: 0
EYE PAIN: 0
CHEST TIGHTNESS: 0
RHINORRHEA: 0
NAUSEA: 0

## 2019-05-04 ASSESSMENT — PAIN DESCRIPTION - ORIENTATION
ORIENTATION: LEFT

## 2019-05-04 ASSESSMENT — PAIN SCALES - GENERAL
PAINLEVEL_OUTOF10: 4
PAINLEVEL_OUTOF10: 5
PAINLEVEL_OUTOF10: 6
PAINLEVEL_OUTOF10: 7
PAINLEVEL_OUTOF10: 5

## 2019-05-04 ASSESSMENT — PAIN DESCRIPTION - ONSET: ONSET: SUDDEN

## 2019-05-04 ASSESSMENT — PAIN DESCRIPTION - LOCATION
LOCATION: CHEST

## 2019-05-04 ASSESSMENT — PAIN - FUNCTIONAL ASSESSMENT: PAIN_FUNCTIONAL_ASSESSMENT: ACTIVITIES ARE NOT PREVENTED

## 2019-05-04 ASSESSMENT — PAIN DESCRIPTION - DESCRIPTORS
DESCRIPTORS: ACHING
DESCRIPTORS: ACHING

## 2019-05-04 NOTE — ED NOTES
No need to ambulate patient per Dr Samina Sims. OK to discharge.       Naty Christianson RN  05/04/19 [id number]

## 2019-05-04 NOTE — ED PROVIDER NOTES
Exam   Constitutional: He is oriented to person, place, and time. He appears well-developed and well-nourished. No distress. HENT:   Head: Normocephalic and atraumatic. Right Ear: External ear normal.   Left Ear: External ear normal.   Mouth/Throat: Oropharynx is clear and moist. No oropharyngeal exudate. Eyes: Pupils are equal, round, and reactive to light. Conjunctivae and EOM are normal. Right eye exhibits no discharge. Left eye exhibits no discharge. Neck: Normal range of motion. Neck supple. No thyromegaly present. Cardiovascular: Normal rate, regular rhythm and normal heart sounds. No murmur heard. Pulmonary/Chest: Effort normal and breath sounds normal. No respiratory distress. He has no wheezes. He has no rales. He exhibits no tenderness. Abdominal: Soft. He exhibits no distension and no mass. There is no tenderness. There is no rebound and no guarding. Musculoskeletal: Normal range of motion. He exhibits tenderness. Soft tissue texture changes over the left rhomboid with some noted tenderness palpation. Neurological: He is alert and oriented to person, place, and time. Skin: Skin is warm and dry. No rash noted. He is not diaphoretic. Psychiatric: He has a normal mood and affect. His behavior is normal. Judgment and thought content normal.       Procedures    MDM  Number of Diagnoses or Management Options  Back strain, initial encounter:   Diagnosis management comments: Patient is an 26-year-old male who presented to ED for evaluation of pleuritic chest pain. On arrival to ED, physical exam significant for soft tissue texture changes/tenderness to palpation over the left rhomboid with tenderness to palpation over the left anterior chest wall as well. EKG with LAFB and RBBB as noted previously. Labs reviewed-- no leukocytosis, serum chemistry unremarkable, troponin WNL. CTA with no evidence of pulmonary embolism or acute cardiopulmonary process.  Patient was administered analgesic 37.0 - 54.0 %    MCV 93.4 80.0 - 99.9 fL    MCH 28.4 26.0 - 35.0 pg    MCHC 30.4 (L) 32.0 - 34.5 %    RDW 15.8 (H) 11.5 - 15.0 fL    Platelets 690 311 - 180 E9/L    MPV 11.0 7.0 - 12.0 fL    Neutrophils % 90.3 (H) 43.0 - 80.0 %    Immature Granulocytes % 1.0 0.0 - 5.0 %    Lymphocytes % 5.0 (L) 20.0 - 42.0 %    Monocytes % 3.5 2.0 - 12.0 %    Eosinophils % 0.0 0.0 - 6.0 %    Basophils % 0.2 0.0 - 2.0 %    Neutrophils # 17.72 (H) 1.80 - 7.30 E9/L    Immature Granulocytes # 0.19 E9/L    Lymphocytes # 0.97 (L) 1.50 - 4.00 E9/L    Monocytes # 0.68 0.10 - 0.95 E9/L    Eosinophils # 0.00 (L) 0.05 - 0.50 E9/L    Basophils # 0.03 0.00 - 0.20 F6/F   Basic Metabolic Panel w/ Reflex to MG   Result Value Ref Range    Sodium 144 132 - 146 mmol/L    Potassium reflex Magnesium 6.0 (H) 3.5 - 5.0 mmol/L    Chloride 109 (H) 98 - 107 mmol/L    CO2 24 22 - 29 mmol/L    Anion Gap 11 7 - 16 mmol/L    Glucose 106 (H) 74 - 99 mg/dL    BUN 26 (H) 8 - 23 mg/dL    CREATININE 0.9 0.7 - 1.2 mg/dL    GFR Non-African American >60 >=60 mL/min/1.73    GFR African American >60     Calcium 8.6 8.6 - 10.2 mg/dL   Troponin   Result Value Ref Range    Troponin <0.01 0.00 - 0.03 ng/mL   Brain Natriuretic Peptide   Result Value Ref Range    Pro-BNP 2,142 (H) 0 - 450 pg/mL   Protime-INR   Result Value Ref Range    Protime 12.0 9.3 - 12.4 sec    INR 1.1    APTT   Result Value Ref Range    aPTT 24.4 (L) 24.5 - 35.1 sec   Lactate, Sepsis   Result Value Ref Range    Lactic Acid, Sepsis 1.1 0.5 - 1.9 mmol/L   Basic Metabolic Panel   Result Value Ref Range    Sodium 143 132 - 146 mmol/L    Potassium 4.1 3.5 - 5.0 mmol/L    Chloride 107 98 - 107 mmol/L    CO2 25 22 - 29 mmol/L    Anion Gap 11 7 - 16 mmol/L    Glucose 114 (H) 74 - 99 mg/dL    BUN 24 (H) 8 - 23 mg/dL    CREATININE 0.9 0.7 - 1.2 mg/dL    GFR Non-African American >60 >=60 mL/min/1.73    GFR African American >60     Calcium 8.8 8.6 - 10.2 mg/dL   EKG 12 Lead   Result Value Ref Range    Ventricular here for re evaluation. They will followup with their primary care physician by calling their office on Monday.      --------------------------------- ADDITIONAL PROVIDER NOTES ---------------------------------  At this time the patient is without objective evidence of an acute process requiring hospitalization or inpatient management. They have remained hemodynamically stable throughout their entire ED visit and are stable for discharge with outpatient follow-up. The plan has been discussed in detail and they are aware of the specific conditions for emergent return, as well as the importance of follow-up. New Prescriptions    HYDROCODONE-ACETAMINOPHEN (NORCO) 5-325 MG PER TABLET    Take 1 tablet by mouth every 6 hours as needed for Pain for up to 3 days. Intended supply: 3 days. Take lowest dose possible to manage pain       Diagnosis:  1. Back strain, initial encounter        Disposition:  Patient's disposition: Discharge to home  Patient's condition is stable.        Jaz Prater DO  Resident  05/04/19 Nik[facility] [place]

## 2019-05-09 LAB
BLOOD CULTURE, ROUTINE: NORMAL
CULTURE, BLOOD 2: NORMAL

## 2019-07-05 ENCOUNTER — HOSPITAL ENCOUNTER (OUTPATIENT)
Age: 79
Discharge: HOME | End: 2019-07-05
Payer: MEDICARE

## 2019-07-05 PROCEDURE — 80299 QUANTITATIVE ASSAY DRUG: CPT

## 2019-07-10 LAB
Lab: NORMAL
REPORT: NORMAL
THIS TEST SENT TO: NORMAL

## 2019-08-12 ENCOUNTER — HOSPITAL ENCOUNTER (OUTPATIENT)
Age: 79
Setting detail: OBSERVATION
Discharge: HOME | End: 2019-08-14
Attending: INTERNAL MEDICINE | Admitting: INTERNAL MEDICINE
Payer: MEDICARE

## 2019-08-12 ENCOUNTER — HOSPITAL ENCOUNTER (EMERGENCY)
Age: 79
Discharge: ANOTHER HOSPITAL | End: 2019-08-12
Attending: EMERGENCY MEDICINE
Payer: MEDICARE

## 2019-08-12 ENCOUNTER — APPOINTMENT (OUTPATIENT)
Dept: GENERAL RADIOLOGY | Age: 79
End: 2019-08-12
Payer: MEDICARE

## 2019-08-12 ENCOUNTER — APPOINTMENT (OUTPATIENT)
Dept: MRI IMAGING | Age: 79
End: 2019-08-12
Attending: INTERNAL MEDICINE
Payer: MEDICARE

## 2019-08-12 ENCOUNTER — HOSPITAL ENCOUNTER (OUTPATIENT)
Age: 79
Discharge: HOME | End: 2019-08-12
Payer: MEDICARE

## 2019-08-12 ENCOUNTER — APPOINTMENT (OUTPATIENT)
Dept: CT IMAGING | Age: 79
End: 2019-08-12
Payer: MEDICARE

## 2019-08-12 VITALS
WEIGHT: 140 LBS | RESPIRATION RATE: 18 BRPM | BODY MASS INDEX: 17.97 KG/M2 | TEMPERATURE: 97.5 F | DIASTOLIC BLOOD PRESSURE: 82 MMHG | HEIGHT: 74 IN | SYSTOLIC BLOOD PRESSURE: 183 MMHG | OXYGEN SATURATION: 94 % | HEART RATE: 56 BPM

## 2019-08-12 DIAGNOSIS — K59.00 CONSTIPATION, UNSPECIFIED CONSTIPATION TYPE: ICD-10-CM

## 2019-08-12 DIAGNOSIS — G45.9 TIA (TRANSIENT ISCHEMIC ATTACK): Primary | ICD-10-CM

## 2019-08-12 LAB
ALBUMIN SERPL-MCNC: 3.2 G/DL (ref 3.5–5.2)
ALP BLD-CCNC: 111 U/L (ref 40–129)
ALT SERPL-CCNC: 17 U/L (ref 0–40)
ANION GAP SERPL CALCULATED.3IONS-SCNC: 9 MMOL/L (ref 7–16)
APTT: 26.4 SEC (ref 24.5–35.1)
AST SERPL-CCNC: 19 U/L (ref 0–39)
BASOPHILS ABSOLUTE: 0.03 E9/L (ref 0–0.2)
BASOPHILS RELATIVE PERCENT: 0.3 % (ref 0–2)
BILIRUB SERPL-MCNC: 0.2 MG/DL (ref 0–1.2)
BILIRUBIN URINE: NEGATIVE
BLOOD, URINE: NEGATIVE
BUN BLDV-MCNC: 19 MG/DL (ref 8–23)
CALCIUM SERPL-MCNC: 8.8 MG/DL (ref 8.6–10.2)
CHLORIDE BLD-SCNC: 109 MMOL/L (ref 98–107)
CHP ED QC CHECK: NORMAL
CLARITY: CLEAR
CO2: 25 MMOL/L (ref 22–29)
COLOR: YELLOW
CREAT SERPL-MCNC: 1 MG/DL (ref 0.7–1.2)
EKG ATRIAL RATE: 67 BPM
EKG P AXIS: 76 DEGREES
EKG P-R INTERVAL: 142 MS
EKG Q-T INTERVAL: 462 MS
EKG QRS DURATION: 144 MS
EKG QTC CALCULATION (BAZETT): 488 MS
EKG R AXIS: -85 DEGREES
EKG T AXIS: 49 DEGREES
EKG VENTRICULAR RATE: 67 BPM
EOSINOPHILS ABSOLUTE: 0.22 E9/L (ref 0.05–0.5)
EOSINOPHILS RELATIVE PERCENT: 1.9 % (ref 0–6)
GFR AFRICAN AMERICAN: >60
GFR NON-AFRICAN AMERICAN: >60 ML/MIN/1.73
GLUCOSE BLD-MCNC: 85 MG/DL
GLUCOSE BLD-MCNC: 93 MG/DL (ref 74–99)
GLUCOSE URINE: NEGATIVE MG/DL
HCT VFR BLD CALC: 35.8 % (ref 37–54)
HEMOGLOBIN: 10.9 G/DL (ref 12.5–16.5)
IMMATURE GRANULOCYTES #: 0.06 E9/L
IMMATURE GRANULOCYTES %: 0.5 % (ref 0–5)
INR BLD: 1.1
KETONES, URINE: NEGATIVE MG/DL
LEUKOCYTE ESTERASE, URINE: NEGATIVE
LYMPHOCYTES ABSOLUTE: 0.78 E9/L (ref 1.5–4)
LYMPHOCYTES RELATIVE PERCENT: 6.7 % (ref 20–42)
MCH RBC QN AUTO: 27.8 PG (ref 26–35)
MCHC RBC AUTO-ENTMCNC: 30.4 % (ref 32–34.5)
MCV RBC AUTO: 91.3 FL (ref 80–99.9)
METER GLUCOSE: 85 MG/DL (ref 74–99)
METER GLUCOSE: 85 MG/DL (ref 74–99)
MONOCYTES ABSOLUTE: 0.81 E9/L (ref 0.1–0.95)
MONOCYTES RELATIVE PERCENT: 7 % (ref 2–12)
NEUTROPHILS ABSOLUTE: 9.69 E9/L (ref 1.8–7.3)
NEUTROPHILS RELATIVE PERCENT: 83.6 % (ref 43–80)
NITRITE, URINE: NEGATIVE
PDW BLD-RTO: 15.9 FL (ref 11.5–15)
PH UA: 7.5 (ref 5–9)
PLATELET # BLD: 287 E9/L (ref 130–450)
PMV BLD AUTO: 10 FL (ref 7–12)
POTASSIUM SERPL-SCNC: 4.4 MMOL/L (ref 3.5–5)
PROTEIN UA: NEGATIVE MG/DL
PROTHROMBIN TIME: 12.9 SEC (ref 9.3–12.4)
RBC # BLD: 3.92 E12/L (ref 3.8–5.8)
SODIUM BLD-SCNC: 143 MMOL/L (ref 132–146)
SPECIFIC GRAVITY UA: 1.01 (ref 1–1.03)
TOTAL PROTEIN: 6.6 G/DL (ref 6.4–8.3)
TROPONIN: <0.01 NG/ML (ref 0–0.03)
UROBILINOGEN, URINE: 0.2 E.U./DL
WBC # BLD: 11.6 E9/L (ref 4.5–11.5)

## 2019-08-12 PROCEDURE — 70551 MRI BRAIN STEM W/O DYE: CPT

## 2019-08-12 PROCEDURE — 94664 DEMO&/EVAL PT USE INHALER: CPT

## 2019-08-12 PROCEDURE — A0426 ALS 1: HCPCS

## 2019-08-12 PROCEDURE — 36415 COLL VENOUS BLD VENIPUNCTURE: CPT

## 2019-08-12 PROCEDURE — 6360000002 HC RX W HCPCS: Performed by: INTERNAL MEDICINE

## 2019-08-12 PROCEDURE — 93005 ELECTROCARDIOGRAM TRACING: CPT | Performed by: STUDENT IN AN ORGANIZED HEALTH CARE EDUCATION/TRAINING PROGRAM

## 2019-08-12 PROCEDURE — 93010 ELECTROCARDIOGRAM REPORT: CPT | Performed by: INTERNAL MEDICINE

## 2019-08-12 PROCEDURE — 6370000000 HC RX 637 (ALT 250 FOR IP): Performed by: EMERGENCY MEDICINE

## 2019-08-12 PROCEDURE — 94640 AIRWAY INHALATION TREATMENT: CPT

## 2019-08-12 PROCEDURE — 81003 URINALYSIS AUTO W/O SCOPE: CPT

## 2019-08-12 PROCEDURE — 84484 ASSAY OF TROPONIN QUANT: CPT

## 2019-08-12 PROCEDURE — 74176 CT ABD & PELVIS W/O CONTRAST: CPT

## 2019-08-12 PROCEDURE — 70450 CT HEAD/BRAIN W/O DYE: CPT

## 2019-08-12 PROCEDURE — 6370000000 HC RX 637 (ALT 250 FOR IP): Performed by: INTERNAL MEDICINE

## 2019-08-12 PROCEDURE — 85025 COMPLETE CBC W/AUTO DIFF WBC: CPT

## 2019-08-12 PROCEDURE — 85610 PROTHROMBIN TIME: CPT

## 2019-08-12 PROCEDURE — 80053 COMPREHEN METABOLIC PANEL: CPT

## 2019-08-12 PROCEDURE — 85730 THROMBOPLASTIN TIME PARTIAL: CPT

## 2019-08-12 PROCEDURE — 82962 GLUCOSE BLOOD TEST: CPT

## 2019-08-12 PROCEDURE — G0378 HOSPITAL OBSERVATION PER HR: HCPCS

## 2019-08-12 PROCEDURE — 99285 EMERGENCY DEPT VISIT HI MDM: CPT

## 2019-08-12 PROCEDURE — G0379 DIRECT REFER HOSPITAL OBSERV: HCPCS

## 2019-08-12 PROCEDURE — 71045 X-RAY EXAM CHEST 1 VIEW: CPT

## 2019-08-12 PROCEDURE — A0425 GROUND MILEAGE: HCPCS

## 2019-08-12 RX ORDER — ALBUTEROL SULFATE 2.5 MG/3ML
2.5 SOLUTION RESPIRATORY (INHALATION) EVERY 6 HOURS PRN
Status: DISCONTINUED | OUTPATIENT
Start: 2019-08-12 | End: 2019-08-14 | Stop reason: HOSPADM

## 2019-08-12 RX ORDER — ASPIRIN 81 MG/1
81 TABLET, CHEWABLE ORAL DAILY
Status: DISCONTINUED | OUTPATIENT
Start: 2019-08-13 | End: 2019-08-14 | Stop reason: HOSPADM

## 2019-08-12 RX ORDER — VORICONAZOLE 200 MG/1
200 TABLET, FILM COATED ORAL 2 TIMES DAILY
Status: DISCONTINUED | OUTPATIENT
Start: 2019-08-12 | End: 2019-08-14 | Stop reason: HOSPADM

## 2019-08-12 RX ORDER — SIMVASTATIN 20 MG
20 TABLET ORAL NIGHTLY
Status: DISCONTINUED | OUTPATIENT
Start: 2019-08-12 | End: 2019-08-14 | Stop reason: HOSPADM

## 2019-08-12 RX ORDER — MELOXICAM 7.5 MG/1
15 TABLET ORAL DAILY
Status: DISCONTINUED | OUTPATIENT
Start: 2019-08-12 | End: 2019-08-14 | Stop reason: HOSPADM

## 2019-08-12 RX ORDER — ASPIRIN 81 MG/1
324 TABLET, CHEWABLE ORAL ONCE
Status: COMPLETED | OUTPATIENT
Start: 2019-08-12 | End: 2019-08-12

## 2019-08-12 RX ORDER — IPRATROPIUM BROMIDE AND ALBUTEROL SULFATE 2.5; .5 MG/3ML; MG/3ML
1 SOLUTION RESPIRATORY (INHALATION) EVERY 4 HOURS PRN
Status: DISCONTINUED | OUTPATIENT
Start: 2019-08-12 | End: 2019-08-14 | Stop reason: HOSPADM

## 2019-08-12 RX ORDER — BUDESONIDE 0.5 MG/2ML
0.5 INHALANT ORAL 2 TIMES DAILY
Status: DISCONTINUED | OUTPATIENT
Start: 2019-08-12 | End: 2019-08-14 | Stop reason: HOSPADM

## 2019-08-12 RX ORDER — TAMSULOSIN HYDROCHLORIDE 0.4 MG/1
0.4 CAPSULE ORAL DAILY
Status: DISCONTINUED | OUTPATIENT
Start: 2019-08-12 | End: 2019-08-14 | Stop reason: HOSPADM

## 2019-08-12 RX ADMIN — IPRATROPIUM BROMIDE AND ALBUTEROL SULFATE 1 AMPULE: .5; 3 SOLUTION RESPIRATORY (INHALATION) at 22:20

## 2019-08-12 RX ADMIN — SIMVASTATIN 20 MG: 20 TABLET, FILM COATED ORAL at 20:13

## 2019-08-12 RX ADMIN — BUDESONIDE 500 MCG: 0.5 SUSPENSION RESPIRATORY (INHALATION) at 22:19

## 2019-08-12 RX ADMIN — ASPIRIN 81 MG 324 MG: 81 TABLET ORAL at 12:25

## 2019-08-12 RX ADMIN — TAMSULOSIN HYDROCHLORIDE 0.4 MG: 0.4 CAPSULE ORAL at 20:13

## 2019-08-12 RX ADMIN — VORICONAZOLE 200 MG: 200 TABLET ORAL at 20:13

## 2019-08-12 ASSESSMENT — ENCOUNTER SYMPTOMS
CHEST TIGHTNESS: 0
ABDOMINAL PAIN: 1
SINUS PAIN: 0
RHINORRHEA: 0
DIARRHEA: 0
SHORTNESS OF BREATH: 0
SORE THROAT: 0
NAUSEA: 0
COUGH: 0
CONSTIPATION: 0

## 2019-08-12 ASSESSMENT — PAIN DESCRIPTION - LOCATION: LOCATION: ABDOMEN

## 2019-08-12 ASSESSMENT — PAIN SCALES - GENERAL
PAINLEVEL_OUTOF10: 5
PAINLEVEL_OUTOF10: 0

## 2019-08-12 ASSESSMENT — PAIN DESCRIPTION - ORIENTATION: ORIENTATION: MID

## 2019-08-12 ASSESSMENT — PAIN DESCRIPTION - PAIN TYPE: TYPE: ACUTE PAIN

## 2019-08-12 ASSESSMENT — PAIN DESCRIPTION - FREQUENCY: FREQUENCY: CONTINUOUS

## 2019-08-12 ASSESSMENT — PAIN DESCRIPTION - DESCRIPTORS: DESCRIPTORS: ACHING

## 2019-08-12 NOTE — ED PROVIDER NOTES
does not drink alcohol or use drugs. Family History: family history includes Other in his father and mother. The patients home medications have been reviewed.     Allergies: Sulfa antibiotics and Vancomycin    -------------------------------------------------- RESULTS -------------------------------------------------    Lab  Results for orders placed or performed during the hospital encounter of 08/12/19   CBC Auto Differential   Result Value Ref Range    WBC 11.6 (H) 4.5 - 11.5 E9/L    RBC 3.92 3.80 - 5.80 E12/L    Hemoglobin 10.9 (L) 12.5 - 16.5 g/dL    Hematocrit 35.8 (L) 37.0 - 54.0 %    MCV 91.3 80.0 - 99.9 fL    MCH 27.8 26.0 - 35.0 pg    MCHC 30.4 (L) 32.0 - 34.5 %    RDW 15.9 (H) 11.5 - 15.0 fL    Platelets 517 150 - 318 E9/L    MPV 10.0 7.0 - 12.0 fL    Neutrophils % 83.6 (H) 43.0 - 80.0 %    Immature Granulocytes % 0.5 0.0 - 5.0 %    Lymphocytes % 6.7 (L) 20.0 - 42.0 %    Monocytes % 7.0 2.0 - 12.0 %    Eosinophils % 1.9 0.0 - 6.0 %    Basophils % 0.3 0.0 - 2.0 %    Neutrophils # 9.69 (H) 1.80 - 7.30 E9/L    Immature Granulocytes # 0.06 E9/L    Lymphocytes # 0.78 (L) 1.50 - 4.00 E9/L    Monocytes # 0.81 0.10 - 0.95 E9/L    Eosinophils # 0.22 0.05 - 0.50 E9/L    Basophils # 0.03 0.00 - 0.20 E9/L   Comprehensive Metabolic Panel   Result Value Ref Range    Sodium 143 132 - 146 mmol/L    Potassium 4.4 3.5 - 5.0 mmol/L    Chloride 109 (H) 98 - 107 mmol/L    CO2 25 22 - 29 mmol/L    Anion Gap 9 7 - 16 mmol/L    Glucose 93 74 - 99 mg/dL    BUN 19 8 - 23 mg/dL    CREATININE 1.0 0.7 - 1.2 mg/dL    GFR Non-African American >60 >=60 mL/min/1.73    GFR African American >60     Calcium 8.8 8.6 - 10.2 mg/dL    Total Protein 6.6 6.4 - 8.3 g/dL    Alb 3.2 (L) 3.5 - 5.2 g/dL    Total Bilirubin 0.2 0.0 - 1.2 mg/dL    Alkaline Phosphatase 111 40 - 129 U/L    ALT 17 0 - 40 U/L    AST 19 0 - 39 U/L   Troponin   Result Value Ref Range    Troponin <0.01 0.00 - 0.03 ng/mL   Protime-INR   Result Value Ref Range    Protime 12.9 (H) 9.3 - 12.4 sec    INR 1.1    APTT   Result Value Ref Range    aPTT 26.4 24.5 - 35.1 sec   POCT Glucose   Result Value Ref Range    Glucose 85 mg/dL    QC OK? ok    EKG 12 Lead   Result Value Ref Range    Ventricular Rate 67 BPM    Atrial Rate 67 BPM    P-R Interval 142 ms    QRS Duration 144 ms    Q-T Interval 462 ms    QTc Calculation (Bazett) 488 ms    P Axis 76 degrees    R Axis -85 degrees    T Axis 49 degrees       Radiology  CT Head WO Contrast   Final Result   1. No CT evidence of acute intracranial abnormality, as questioned. Given the reported history described above, further evaluation with   MRI of the brain is recommended given its superior sensitivity in   detection of potential cerebrovascular ischemia or infarction of the   hips. 2. Moderate cerebral volume loss/atrophy with white matter changes   suggestive of sequelae small vessel ischemic disease. 3. Vascular calcifications within the bilateral internal carotid   artery cavernous segments. .      CT ABDOMEN PELVIS WO CONTRAST Additional Contrast? None   Final Result   Significant amount residual fecal debris in the rectum and sigmoid   with significant rectal distention      Prostate is mildly enlarged etiology indeterminate. Significant amount of vascular plaque and evidence of prior aortic   stent graft. Native aorta remains aneurysmally dilated measuring 5.5   cm. Significant amount of pleural and parenchymal scarring, loculated   effusion and right basilar atelectasis/infiltrate with volume loss in   the right hemithorax. There is hyperinflation of the left lung and   scarring in the left base         XR CHEST PORTABLE   Final Result   1. Possible right basilar infiltrate/pneumonia and small amount of   right pleural fluid.    2. Persistent right hemidiaphragmatic elevation.                ------------------------- NURSING NOTES AND VITALS REVIEWED ---------------------------  Date / Time Roomed:  8/12/2019 10:05 AM  ED procedures and agree with all pertinent clinical information unless otherwise noted. Please note that the withdrawal or failure to initiate urgent interventions for this patient would likely result in a life threatening deterioration or permanent disability. Accordingly this patient received 30 minutes of critical care time, excluding separately billable procedures.       Electronically signed by Sera Villegas DO on 9/2/19 at 3:33 PM             Sera Villegas DO  09/02/19 [id number]

## 2019-08-13 ENCOUNTER — APPOINTMENT (OUTPATIENT)
Dept: ULTRASOUND IMAGING | Age: 79
End: 2019-08-13
Attending: INTERNAL MEDICINE
Payer: MEDICARE

## 2019-08-13 LAB
LV EF: 58 %
LVEF MODALITY: NORMAL

## 2019-08-13 PROCEDURE — 6370000000 HC RX 637 (ALT 250 FOR IP): Performed by: INTERNAL MEDICINE

## 2019-08-13 PROCEDURE — 92523 SPEECH SOUND LANG COMPREHEN: CPT

## 2019-08-13 PROCEDURE — 94640 AIRWAY INHALATION TREATMENT: CPT

## 2019-08-13 PROCEDURE — G0378 HOSPITAL OBSERVATION PER HR: HCPCS

## 2019-08-13 PROCEDURE — 97161 PT EVAL LOW COMPLEX 20 MIN: CPT

## 2019-08-13 PROCEDURE — 2700000000 HC OXYGEN THERAPY PER DAY

## 2019-08-13 PROCEDURE — 99218 PR INITIAL OBSERVATION CARE/DAY 30 MINUTES: CPT | Performed by: PSYCHIATRY & NEUROLOGY

## 2019-08-13 PROCEDURE — 96372 THER/PROPH/DIAG INJ SC/IM: CPT

## 2019-08-13 PROCEDURE — 93306 TTE W/DOPPLER COMPLETE: CPT

## 2019-08-13 PROCEDURE — 6360000002 HC RX W HCPCS: Performed by: INTERNAL MEDICINE

## 2019-08-13 PROCEDURE — 99205 OFFICE O/P NEW HI 60 MIN: CPT | Performed by: INTERNAL MEDICINE

## 2019-08-13 PROCEDURE — 93880 EXTRACRANIAL BILAT STUDY: CPT

## 2019-08-13 RX ORDER — SENNA PLUS 8.6 MG/1
2 TABLET ORAL NIGHTLY
Status: DISCONTINUED | OUTPATIENT
Start: 2019-08-13 | End: 2019-08-14 | Stop reason: HOSPADM

## 2019-08-13 RX ORDER — POLYETHYLENE GLYCOL 3350 17 G/17G
17 POWDER, FOR SOLUTION ORAL 2 TIMES DAILY
Status: DISCONTINUED | OUTPATIENT
Start: 2019-08-13 | End: 2019-08-14 | Stop reason: HOSPADM

## 2019-08-13 RX ADMIN — VORICONAZOLE 200 MG: 200 TABLET ORAL at 09:45

## 2019-08-13 RX ADMIN — ASPIRIN 81 MG 81 MG: 81 TABLET ORAL at 09:30

## 2019-08-13 RX ADMIN — MELOXICAM 15 MG: 7.5 TABLET ORAL at 09:29

## 2019-08-13 RX ADMIN — ENOXAPARIN SODIUM 40 MG: 40 INJECTION SUBCUTANEOUS at 09:29

## 2019-08-13 RX ADMIN — VORICONAZOLE 200 MG: 200 TABLET ORAL at 22:17

## 2019-08-13 RX ADMIN — IPRATROPIUM BROMIDE AND ALBUTEROL SULFATE 1 AMPULE: .5; 3 SOLUTION RESPIRATORY (INHALATION) at 10:50

## 2019-08-13 RX ADMIN — POLYETHYLENE GLYCOL 3350 17 G: 17 POWDER, FOR SOLUTION ORAL at 12:48

## 2019-08-13 RX ADMIN — METOPROLOL TARTRATE 25 MG: 25 TABLET ORAL at 09:29

## 2019-08-13 RX ADMIN — GLYCOPYRROLATE AND FORMOTEROL FUMARATE 2 PUFF: 9; 4.8 AEROSOL, METERED RESPIRATORY (INHALATION) at 21:22

## 2019-08-13 RX ADMIN — GLYCOPYRROLATE AND FORMOTEROL FUMARATE 2 PUFF: 9; 4.8 AEROSOL, METERED RESPIRATORY (INHALATION) at 09:30

## 2019-08-13 RX ADMIN — SIMVASTATIN 20 MG: 20 TABLET, FILM COATED ORAL at 21:22

## 2019-08-13 RX ADMIN — BUDESONIDE 500 MCG: 0.5 SUSPENSION RESPIRATORY (INHALATION) at 10:51

## 2019-08-13 RX ADMIN — POLYETHYLENE GLYCOL 3350 17 G: 17 POWDER, FOR SOLUTION ORAL at 21:21

## 2019-08-13 RX ADMIN — TAMSULOSIN HYDROCHLORIDE 0.4 MG: 0.4 CAPSULE ORAL at 21:22

## 2019-08-13 RX ADMIN — BUDESONIDE 500 MCG: 0.5 SUSPENSION RESPIRATORY (INHALATION) at 21:35

## 2019-08-13 ASSESSMENT — PAIN SCALES - GENERAL: PAINLEVEL_OUTOF10: 0

## 2019-08-13 NOTE — H&P
WBC 11.6 08/12/2019    RBC 3.92 08/12/2019    HGB 10.9 08/12/2019    HCT 35.8 08/12/2019     08/12/2019    MCV 91.3 08/12/2019    MCH 27.8 08/12/2019    MCHC 30.4 08/12/2019    RDW 15.9 08/12/2019    LYMPHOPCT 6.7 08/12/2019    MONOPCT 7.0 08/12/2019    BASOPCT 0.3 08/12/2019    MONOSABS 0.81 08/12/2019    LYMPHSABS 0.78 08/12/2019    EOSABS 0.22 08/12/2019    BASOSABS 0.03 08/12/2019     CMP:    Lab Results   Component Value Date     08/12/2019    K 4.4 08/12/2019    K 6.0 05/04/2019     08/12/2019    CO2 25 08/12/2019    BUN 19 08/12/2019    CREATININE 1.0 08/12/2019    GFRAA >60 08/12/2019    LABGLOM >60 08/12/2019    GLUCOSE 85 08/12/2019    PROT 6.6 08/12/2019    LABALBU 3.2 08/12/2019    CALCIUM 8.8 08/12/2019    BILITOT 0.2 08/12/2019    ALKPHOS 111 08/12/2019    AST 19 08/12/2019    ALT 17 08/12/2019     Magnesium:  No results found for: MG  Phosphorus:  No results found for: PHOS  PT/INR:    Lab Results   Component Value Date    PROTIME 12.9 08/12/2019    INR 1.1 08/12/2019     Last 3 Troponin:    Lab Results   Component Value Date    TROPONINI <0.01 08/12/2019    TROPONINI <0.01 05/04/2019     U/A:    Lab Results   Component Value Date    COLORU Yellow 08/12/2019    PROTEINU Negative 08/12/2019    PHUR 7.5 08/12/2019    CLARITYU Clear 08/12/2019    SPECGRAV 1.015 08/12/2019    LEUKOCYTESUR Negative 08/12/2019    UROBILINOGEN 0.2 08/12/2019    BILIRUBINUR Negative 08/12/2019    BLOODU Negative 08/12/2019    GLUCOSEU Negative 08/12/2019     ABG:  No results found for: PH, PCO2, PO2, HCO3, BE, THGB, TCO2, O2SAT  HgBA1c:  No results found for: LABA1C  FLP:  No results found for: TRIG, HDL, LDLCALC, LDLDIRECT, LABVLDL  TSH:  No results found for: TSH    US CAROTID ARTERY BILATERAL    (Results Pending)   MRI BRAIN WO CONTRAST    (Results Pending)       ASSESSMENT:      Active Problems:    Mixed hyperlipidemia    Essential hypertension    COPD (chronic obstructive pulmonary disease) ([facility] [address]

## 2019-08-13 NOTE — CONSULTS
Department of Neurological Sciences  Section of General Neurology - Adult  Consult Note    CHIEF COMPLAINT: transient weakness    History Obtained From:  patient       HISTORY OF PRESENT ILLNESS:              The patient is a [de-identified] y.o. male with significant past medical history of AAA, CAD, HTN, HLD, and COPD who presents with a resolved episode of transient weakness. Yesterday morning he was having breakfast at [employer]. He was stirring his coffee when suddenly his left arm went limp and eventually dropped to his side. He could not move his leg either and said it felt like his foot was glued to the floor. His wife said that he also had a left sided facial droop. The ambulance was called, but his symptoms resolved within 10 minutes and he was back to normal by the time they came to transport him to the hospital. He has since had no similar symptoms. He had no numbness or tingling, and no symptoms preceded the onset of weakness.     Past Medical History:        Diagnosis Date    Arthritis     Aspergillus ([address] 2018    Asthma     CAD (coronary artery disease)     Cancer (HCC)     vocal cord, and lung    COPD (chronic obstructive pulmonary disease) (HCC)     Hyperlipidemia     Hypertension     Palpitations     SOB (shortness of breath)      Past Surgical History:        Procedure Laterality Date    ABDOMINAL AORTIC ANEURYSM REPAIR, ENDOVASCULAR      APPENDECTOMY      BACK SURGERY      BRONCHOSCOPY N/A 2/22/2019    BRONCHOSCOPY WITH ANESTHESIA performed by Stacy Terry MD at [facility] CATH LAB PROCEDURE      EYE SURGERY      LUNG CANCER SURGERY      2009    THROAT SURGERY      for cancer in 2016     Current Medications:   Current Facility-Administered Medications: polyethylene glycol (GLYCOLAX) packet 17 g, 17 g, Oral, BID  senna (SENOKOT) tablet 17.2 mg, 2 tablet, Oral, Nightly  albuterol (PROVENTIL) nebulizer solution 2.5 mg, 2.5 mg, Nebulization, Q6H PRN  budesonide
RDCS      Age                79 year(s)     Interpreting         Ting Baca MD                                     Physician                                        Any Other     Procedure    Type of Study      TTE procedure:Echo Complete W/Doppler & Color Flow.     Procedure Date  Date: 08/13/2019 Start: 08:12 AM    Study Location: Echo Lab  Technical Quality: Limited visualization due to lung interference. Indications:CVA. Patient Status: Routine    Contrast Medium: Bubble Study. Height: 74 inches Weight: 140 pounds BSA: 1.87 m^2 BMI: 17.97 kg/m^2    Rhythm: Within normal limits BP: 170/71 mmHg     Findings      Left Ventricle   Left ventricle size is normal.   Normal left ventricular systolic function.   Ejection fraction is visually estimated at 55-60%.     Right Ventricle   Normal right ventricular size and function.      Left Atrium   Normal sized left atrium by volume index.   Nondiagnostic bubble study (limited visualization).      Right Atrium   Normal sized right atrium.      Mitral Valve   Physiologic and/or trace mitral regurgitation.   No evidence of hemodynamically significant mitral stenosis.      Tricuspid Valve   Physiologic and/or trace tricuspid regurgitation.      Aortic Valve   No evidence of hemodynamically significant aortic regurgitation or   stenosis.       Pulmonic Valve   The pulmonic valve was not well visualized.      Pericardial Effusion   No evidence of a hemodynamically significant pericardial effusion.      Aorta   Aortic root dimension within normal limits.      Conclusions      Summary   Normal left ventricular systolic function.   Ejection fraction is visually estimated at 55-60%.   Normal right ventricular size and function.   Nondiagnostic bubble study (limited visualization).      Signature      ----------------------------------------------------------------   Electronically signed by Ting Baca MD(Interpreting   physician) on 08/13/2019 03:06

## 2019-08-14 ENCOUNTER — APPOINTMENT (OUTPATIENT)
Dept: CARDIAC CATH/INVASIVE PROCEDURES | Age: 79
End: 2019-08-14
Attending: INTERNAL MEDICINE
Payer: MEDICARE

## 2019-08-14 ENCOUNTER — APPOINTMENT (OUTPATIENT)
Dept: GENERAL RADIOLOGY | Age: 79
End: 2019-08-14
Attending: INTERNAL MEDICINE
Payer: MEDICARE

## 2019-08-14 VITALS
HEART RATE: 81 BPM | DIASTOLIC BLOOD PRESSURE: 82 MMHG | OXYGEN SATURATION: 97 % | BODY MASS INDEX: 17.97 KG/M2 | SYSTOLIC BLOOD PRESSURE: 138 MMHG | WEIGHT: 140 LBS | HEIGHT: 74 IN | RESPIRATION RATE: 18 BRPM | TEMPERATURE: 97.8 F

## 2019-08-14 LAB
EKG ATRIAL RATE: 46 BPM
EKG P AXIS: 81 DEGREES
EKG P-R INTERVAL: 176 MS
EKG Q-T INTERVAL: 480 MS
EKG QRS DURATION: 144 MS
EKG QTC CALCULATION (BAZETT): 420 MS
EKG R AXIS: -86 DEGREES
EKG T AXIS: -14 DEGREES
EKG VENTRICULAR RATE: 46 BPM
METER GLUCOSE: 92 MG/DL (ref 74–99)

## 2019-08-14 PROCEDURE — 94640 AIRWAY INHALATION TREATMENT: CPT

## 2019-08-14 PROCEDURE — 93005 ELECTROCARDIOGRAM TRACING: CPT | Performed by: NURSE PRACTITIONER

## 2019-08-14 PROCEDURE — G0378 HOSPITAL OBSERVATION PER HR: HCPCS

## 2019-08-14 PROCEDURE — 6370000000 HC RX 637 (ALT 250 FOR IP): Performed by: INTERNAL MEDICINE

## 2019-08-14 PROCEDURE — 33285 INSJ SUBQ CAR RHYTHM MNTR: CPT | Performed by: INTERNAL MEDICINE

## 2019-08-14 PROCEDURE — 6360000002 HC RX W HCPCS

## 2019-08-14 PROCEDURE — 2700000000 HC OXYGEN THERAPY PER DAY

## 2019-08-14 PROCEDURE — 93010 ELECTROCARDIOGRAM REPORT: CPT | Performed by: INTERNAL MEDICINE

## 2019-08-14 PROCEDURE — C1764 EVENT RECORDER, CARDIAC: HCPCS

## 2019-08-14 PROCEDURE — 71045 X-RAY EXAM CHEST 1 VIEW: CPT

## 2019-08-14 PROCEDURE — 82962 GLUCOSE BLOOD TEST: CPT

## 2019-08-14 PROCEDURE — 6360000002 HC RX W HCPCS: Performed by: INTERNAL MEDICINE

## 2019-08-14 RX ORDER — SENNA AND DOCUSATE SODIUM 50; 8.6 MG/1; MG/1
2 TABLET, FILM COATED ORAL NIGHTLY PRN
Qty: 60 TABLET | Refills: 0 | Status: SHIPPED | OUTPATIENT
Start: 2019-08-14 | End: 2021-08-30

## 2019-08-14 RX ORDER — ATROPINE SULFATE 0.1 MG/ML
INJECTION INTRAVENOUS
Status: COMPLETED
Start: 2019-08-14 | End: 2019-08-14

## 2019-08-14 RX ORDER — ATORVASTATIN CALCIUM 40 MG/1
40 TABLET, FILM COATED ORAL DAILY
Qty: 30 TABLET | Refills: 5 | Status: SHIPPED | OUTPATIENT
Start: 2019-08-14 | End: 2019-11-19

## 2019-08-14 RX ADMIN — GLYCOPYRROLATE AND FORMOTEROL FUMARATE 2 PUFF: 9; 4.8 AEROSOL, METERED RESPIRATORY (INHALATION) at 09:47

## 2019-08-14 RX ADMIN — ASPIRIN 81 MG 81 MG: 81 TABLET ORAL at 09:47

## 2019-08-14 RX ADMIN — POLYETHYLENE GLYCOL 3350 17 G: 17 POWDER, FOR SOLUTION ORAL at 09:50

## 2019-08-14 RX ADMIN — ATROPINE SULFATE: 0.1 INJECTION, SOLUTION INTRAVENOUS at 10:08

## 2019-08-14 RX ADMIN — METOPROLOL TARTRATE 25 MG: 25 TABLET ORAL at 09:47

## 2019-08-14 RX ADMIN — BUDESONIDE 500 MCG: 0.5 SUSPENSION RESPIRATORY (INHALATION) at 12:06

## 2019-08-14 RX ADMIN — VORICONAZOLE 200 MG: 200 TABLET ORAL at 09:47

## 2019-08-14 RX ADMIN — MELOXICAM 15 MG: 7.5 TABLET ORAL at 09:47

## 2019-08-14 ASSESSMENT — PAIN DESCRIPTION - PROGRESSION: CLINICAL_PROGRESSION: NOT CHANGED

## 2019-08-14 ASSESSMENT — PAIN SCALES - GENERAL
PAINLEVEL_OUTOF10: 0
PAINLEVEL_OUTOF10: 3
PAINLEVEL_OUTOF10: 0

## 2019-08-14 ASSESSMENT — PAIN DESCRIPTION - LOCATION: LOCATION: KNEE

## 2019-08-14 ASSESSMENT — PAIN DESCRIPTION - PAIN TYPE: TYPE: CHRONIC PAIN

## 2019-08-14 ASSESSMENT — PAIN DESCRIPTION - FREQUENCY: FREQUENCY: CONTINUOUS

## 2019-08-14 ASSESSMENT — PAIN DESCRIPTION - DESCRIPTORS: DESCRIPTORS: ACHING;DULL

## 2019-08-14 ASSESSMENT — PAIN DESCRIPTION - ONSET: ONSET: ON-GOING

## 2019-08-14 ASSESSMENT — PAIN DESCRIPTION - ORIENTATION: ORIENTATION: RIGHT;LEFT

## 2019-08-27 ENCOUNTER — NURSE ONLY (OUTPATIENT)
Dept: NON INVASIVE DIAGNOSTICS | Age: 79
End: 2019-08-27
Payer: MEDICARE

## 2019-08-27 DIAGNOSIS — Z95.818 STATUS POST PLACEMENT OF IMPLANTABLE LOOP RECORDER: Primary | ICD-10-CM

## 2019-08-27 DIAGNOSIS — I48.0 PAROXYSMAL ATRIAL FIBRILLATION (HCC): ICD-10-CM

## 2019-08-27 PROCEDURE — 93285 PRGRMG DEV EVAL SCRMS IP: CPT | Performed by: INTERNAL MEDICINE

## 2019-10-09 ENCOUNTER — NURSE ONLY (OUTPATIENT)
Dept: NON INVASIVE DIAGNOSTICS | Age: 79
End: 2019-10-09
Payer: MEDICARE

## 2019-10-09 DIAGNOSIS — Z95.818 STATUS POST PLACEMENT OF IMPLANTABLE LOOP RECORDER: Primary | ICD-10-CM

## 2019-10-09 DIAGNOSIS — I48.0 PAROXYSMAL ATRIAL FIBRILLATION (HCC): ICD-10-CM

## 2019-10-09 PROCEDURE — 93299 PR REM INTERROG ICPMS/SCRMS <30 D TECH REVIEW: CPT | Performed by: INTERNAL MEDICINE

## 2019-10-09 PROCEDURE — 93298 REM INTERROG DEV EVAL SCRMS: CPT | Performed by: INTERNAL MEDICINE

## 2019-11-11 ENCOUNTER — NURSE ONLY (OUTPATIENT)
Dept: NON INVASIVE DIAGNOSTICS | Age: 79
End: 2019-11-11
Payer: MEDICARE

## 2019-11-11 DIAGNOSIS — Z95.818 STATUS POST PLACEMENT OF IMPLANTABLE LOOP RECORDER: Primary | ICD-10-CM

## 2019-11-11 DIAGNOSIS — I48.0 PAROXYSMAL ATRIAL FIBRILLATION (HCC): ICD-10-CM

## 2019-11-11 PROCEDURE — 93298 REM INTERROG DEV EVAL SCRMS: CPT | Performed by: INTERNAL MEDICINE

## 2019-11-11 PROCEDURE — 93299 PR REM INTERROG ICPMS/SCRMS <30 D TECH REVIEW: CPT | Performed by: INTERNAL MEDICINE

## 2019-11-20 ENCOUNTER — HOSPITAL ENCOUNTER (OUTPATIENT)
Age: 79
Discharge: HOME | End: 2019-11-22
Payer: MEDICARE

## 2019-11-20 ENCOUNTER — HOSPITAL ENCOUNTER (OUTPATIENT)
Dept: GENERAL RADIOLOGY | Age: 79
Discharge: HOME | End: 2019-11-22
Payer: MEDICARE

## 2019-11-20 DIAGNOSIS — J43.9 PULMONARY EMPHYSEMA, UNSPECIFIED EMPHYSEMA TYPE (HCC): ICD-10-CM

## 2019-11-20 PROCEDURE — 71046 X-RAY EXAM CHEST 2 VIEWS: CPT

## 2019-11-22 ENCOUNTER — TELEPHONE (OUTPATIENT)
Dept: NON INVASIVE DIAGNOSTICS | Age: 79
End: 2019-11-22

## 2019-12-03 ENCOUNTER — HOSPITAL ENCOUNTER (OUTPATIENT)
Age: 79
Discharge: HOME | End: 2019-12-03
Payer: MEDICARE

## 2019-12-03 LAB
ALBUMIN SERPL-MCNC: 3.8 G/DL (ref 3.5–5.2)
ALP BLD-CCNC: 189 U/L (ref 40–129)
ALT SERPL-CCNC: 8 U/L (ref 0–40)
ANION GAP SERPL CALCULATED.3IONS-SCNC: 10 MMOL/L (ref 7–16)
AST SERPL-CCNC: 11 U/L (ref 0–39)
BASOPHILS ABSOLUTE: 0.03 E9/L (ref 0–0.2)
BASOPHILS RELATIVE PERCENT: 0.2 % (ref 0–2)
BILIRUB SERPL-MCNC: <0.2 MG/DL (ref 0–1.2)
BUN BLDV-MCNC: 22 MG/DL (ref 8–23)
CALCIUM SERPL-MCNC: 9.2 MG/DL (ref 8.6–10.2)
CHLORIDE BLD-SCNC: 107 MMOL/L (ref 98–107)
CO2: 26 MMOL/L (ref 22–29)
CREAT SERPL-MCNC: 0.9 MG/DL (ref 0.7–1.2)
EOSINOPHILS ABSOLUTE: 0 E9/L (ref 0.05–0.5)
EOSINOPHILS RELATIVE PERCENT: 0 % (ref 0–6)
GFR AFRICAN AMERICAN: >60
GFR NON-AFRICAN AMERICAN: >60 ML/MIN/1.73
GLUCOSE BLD-MCNC: 169 MG/DL (ref 74–99)
HCT VFR BLD CALC: 37.4 % (ref 37–54)
HEMOGLOBIN: 11 G/DL (ref 12.5–16.5)
IMMATURE GRANULOCYTES #: 0.1 E9/L
IMMATURE GRANULOCYTES %: 0.6 % (ref 0–5)
LYMPHOCYTES ABSOLUTE: 0.75 E9/L (ref 1.5–4)
LYMPHOCYTES RELATIVE PERCENT: 4.4 % (ref 20–42)
MCH RBC QN AUTO: 26.5 PG (ref 26–35)
MCHC RBC AUTO-ENTMCNC: 29.4 % (ref 32–34.5)
MCV RBC AUTO: 90.1 FL (ref 80–99.9)
MONOCYTES ABSOLUTE: 0.14 E9/L (ref 0.1–0.95)
MONOCYTES RELATIVE PERCENT: 0.8 % (ref 2–12)
NEUTROPHILS ABSOLUTE: 16.16 E9/L (ref 1.8–7.3)
NEUTROPHILS RELATIVE PERCENT: 94 % (ref 43–80)
PDW BLD-RTO: 17 FL (ref 11.5–15)
PLATELET # BLD: 371 E9/L (ref 130–450)
PMV BLD AUTO: 10.7 FL (ref 7–12)
POTASSIUM SERPL-SCNC: 4.4 MMOL/L (ref 3.5–5)
RBC # BLD: 4.15 E12/L (ref 3.8–5.8)
SODIUM BLD-SCNC: 143 MMOL/L (ref 132–146)
TOTAL PROTEIN: 7.2 G/DL (ref 6.4–8.3)
WBC # BLD: 17.2 E9/L (ref 4.5–11.5)

## 2019-12-03 PROCEDURE — 80053 COMPREHEN METABOLIC PANEL: CPT

## 2019-12-03 PROCEDURE — 36415 COLL VENOUS BLD VENIPUNCTURE: CPT

## 2019-12-03 PROCEDURE — 85025 COMPLETE CBC W/AUTO DIFF WBC: CPT

## 2019-12-03 PROCEDURE — 87305 ASPERGILLUS AG IA: CPT

## 2019-12-06 LAB
ASPERGILLUS GALACTO AG: NEGATIVE
ASPERGILLUS GALACTO INDEX: 0.02

## 2019-12-07 ENCOUNTER — HOSPITAL ENCOUNTER (OUTPATIENT)
Dept: CT IMAGING | Age: 79
Discharge: HOME | End: 2019-12-09
Payer: MEDICARE

## 2019-12-07 DIAGNOSIS — R06.00 DYSPNEA, UNSPECIFIED TYPE: ICD-10-CM

## 2019-12-07 PROCEDURE — 71250 CT THORAX DX C-: CPT

## 2019-12-10 ENCOUNTER — HOSPITAL ENCOUNTER (OUTPATIENT)
Age: 79
Discharge: HOME | End: 2019-12-10
Payer: MEDICARE

## 2019-12-10 LAB
ALBUMIN SERPL-MCNC: 3.4 G/DL (ref 3.5–5.2)
ALP BLD-CCNC: 156 U/L (ref 40–129)
ALT SERPL-CCNC: 14 U/L (ref 0–40)
ANION GAP SERPL CALCULATED.3IONS-SCNC: 12 MMOL/L (ref 7–16)
AST SERPL-CCNC: 25 U/L (ref 0–39)
BASOPHILS ABSOLUTE: 0.02 E9/L (ref 0–0.2)
BASOPHILS RELATIVE PERCENT: 0.1 % (ref 0–2)
BILIRUB SERPL-MCNC: 0.2 MG/DL (ref 0–1.2)
BUN BLDV-MCNC: 21 MG/DL (ref 8–23)
CALCIUM SERPL-MCNC: 9 MG/DL (ref 8.6–10.2)
CHLORIDE BLD-SCNC: 102 MMOL/L (ref 98–107)
CO2: 26 MMOL/L (ref 22–29)
CREAT SERPL-MCNC: 0.9 MG/DL (ref 0.7–1.2)
EOSINOPHILS ABSOLUTE: 0.22 E9/L (ref 0.05–0.5)
EOSINOPHILS RELATIVE PERCENT: 1.5 % (ref 0–6)
GFR AFRICAN AMERICAN: >60
GFR NON-AFRICAN AMERICAN: >60 ML/MIN/1.73
GLUCOSE BLD-MCNC: 106 MG/DL (ref 74–99)
HCT VFR BLD CALC: 36 % (ref 37–54)
HEMOGLOBIN: 10.8 G/DL (ref 12.5–16.5)
IMMATURE GRANULOCYTES #: 0.11 E9/L
IMMATURE GRANULOCYTES %: 0.8 % (ref 0–5)
LYMPHOCYTES ABSOLUTE: 0.87 E9/L (ref 1.5–4)
LYMPHOCYTES RELATIVE PERCENT: 5.9 % (ref 20–42)
MCH RBC QN AUTO: 26.6 PG (ref 26–35)
MCHC RBC AUTO-ENTMCNC: 30 % (ref 32–34.5)
MCV RBC AUTO: 88.7 FL (ref 80–99.9)
MONOCYTES ABSOLUTE: 1.32 E9/L (ref 0.1–0.95)
MONOCYTES RELATIVE PERCENT: 9 % (ref 2–12)
NEUTROPHILS ABSOLUTE: 12.1 E9/L (ref 1.8–7.3)
NEUTROPHILS RELATIVE PERCENT: 82.7 % (ref 43–80)
PDW BLD-RTO: 17.2 FL (ref 11.5–15)
PLATELET # BLD: 285 E9/L (ref 130–450)
PMV BLD AUTO: 10.6 FL (ref 7–12)
POTASSIUM SERPL-SCNC: 3.6 MMOL/L (ref 3.5–5)
RBC # BLD: 4.06 E12/L (ref 3.8–5.8)
SODIUM BLD-SCNC: 140 MMOL/L (ref 132–146)
TOTAL PROTEIN: 7 G/DL (ref 6.4–8.3)
WBC # BLD: 14.6 E9/L (ref 4.5–11.5)

## 2019-12-10 PROCEDURE — 36415 COLL VENOUS BLD VENIPUNCTURE: CPT

## 2019-12-10 PROCEDURE — 80053 COMPREHEN METABOLIC PANEL: CPT

## 2019-12-10 PROCEDURE — 85025 COMPLETE CBC W/AUTO DIFF WBC: CPT

## 2019-12-10 PROCEDURE — 80299 QUANTITATIVE ASSAY DRUG: CPT

## 2019-12-10 PROCEDURE — 87305 ASPERGILLUS AG IA: CPT

## 2019-12-12 ENCOUNTER — NURSE ONLY (OUTPATIENT)
Dept: NON INVASIVE DIAGNOSTICS | Age: 79
End: 2019-12-12
Payer: MEDICARE

## 2019-12-12 PROCEDURE — 93298 REM INTERROG DEV EVAL SCRMS: CPT | Performed by: INTERNAL MEDICINE

## 2019-12-12 PROCEDURE — 93299 PR REM INTERROG ICPMS/SCRMS <30 D TECH REVIEW: CPT | Performed by: INTERNAL MEDICINE

## 2019-12-13 LAB
ASPERGILLUS GALACTO AG: NEGATIVE
ASPERGILLUS GALACTO INDEX: 0.04

## 2019-12-16 LAB
Lab: NORMAL
REPORT: NORMAL
THIS TEST SENT TO: NORMAL

## 2019-12-26 ENCOUNTER — ANESTHESIA EVENT (OUTPATIENT)
Dept: ENDOSCOPY | Age: 79
End: 2019-12-26
Payer: MEDICARE

## 2019-12-26 ENCOUNTER — ANESTHESIA (OUTPATIENT)
Dept: ENDOSCOPY | Age: 79
End: 2019-12-26
Payer: MEDICARE

## 2019-12-26 ENCOUNTER — HOSPITAL ENCOUNTER (OUTPATIENT)
Age: 79
Setting detail: OUTPATIENT SURGERY
Discharge: HOME | End: 2019-12-26
Attending: INTERNAL MEDICINE | Admitting: INTERNAL MEDICINE
Payer: MEDICARE

## 2019-12-26 VITALS
SYSTOLIC BLOOD PRESSURE: 148 MMHG | TEMPERATURE: 96.8 F | BODY MASS INDEX: 16.04 KG/M2 | HEART RATE: 87 BPM | DIASTOLIC BLOOD PRESSURE: 88 MMHG | OXYGEN SATURATION: 98 % | RESPIRATION RATE: 18 BRPM | HEIGHT: 74 IN | WEIGHT: 125 LBS

## 2019-12-26 VITALS
DIASTOLIC BLOOD PRESSURE: 75 MMHG | RESPIRATION RATE: 133 BRPM | SYSTOLIC BLOOD PRESSURE: 145 MMHG | OXYGEN SATURATION: 97 %

## 2019-12-26 PROCEDURE — 3700000001 HC ADD 15 MINUTES (ANESTHESIA): Performed by: INTERNAL MEDICINE

## 2019-12-26 PROCEDURE — 3609011300 HC BRONCHOSCOPY BRONCHIAL/ENDOBRNCL BX 1+ SITES: Performed by: INTERNAL MEDICINE

## 2019-12-26 PROCEDURE — 88305 TISSUE EXAM BY PATHOLOGIST: CPT

## 2019-12-26 PROCEDURE — 3700000000 HC ANESTHESIA ATTENDED CARE: Performed by: INTERNAL MEDICINE

## 2019-12-26 PROCEDURE — 87077 CULTURE AEROBIC IDENTIFY: CPT

## 2019-12-26 PROCEDURE — 87205 SMEAR GRAM STAIN: CPT

## 2019-12-26 PROCEDURE — 87206 SMEAR FLUORESCENT/ACID STAI: CPT

## 2019-12-26 PROCEDURE — 6370000000 HC RX 637 (ALT 250 FOR IP): Performed by: INTERNAL MEDICINE

## 2019-12-26 PROCEDURE — 87102 FUNGUS ISOLATION CULTURE: CPT

## 2019-12-26 PROCEDURE — 6360000002 HC RX W HCPCS: Performed by: NURSE ANESTHETIST, CERTIFIED REGISTERED

## 2019-12-26 PROCEDURE — 87116 MYCOBACTERIA CULTURE: CPT

## 2019-12-26 PROCEDURE — 87070 CULTURE OTHR SPECIMN AEROBIC: CPT

## 2019-12-26 PROCEDURE — 88112 CYTOPATH CELL ENHANCE TECH: CPT

## 2019-12-26 PROCEDURE — 7100000011 HC PHASE II RECOVERY - ADDTL 15 MIN: Performed by: INTERNAL MEDICINE

## 2019-12-26 PROCEDURE — 87015 SPECIMEN INFECT AGNT CONCNTJ: CPT

## 2019-12-26 PROCEDURE — 2709999900 HC NON-CHARGEABLE SUPPLY: Performed by: INTERNAL MEDICINE

## 2019-12-26 PROCEDURE — 2500000003 HC RX 250 WO HCPCS: Performed by: INTERNAL MEDICINE

## 2019-12-26 PROCEDURE — 89051 BODY FLUID CELL COUNT: CPT

## 2019-12-26 PROCEDURE — 2580000003 HC RX 258: Performed by: NURSE ANESTHETIST, CERTIFIED REGISTERED

## 2019-12-26 PROCEDURE — 87186 SC STD MICRODIL/AGAR DIL: CPT

## 2019-12-26 PROCEDURE — 7100000010 HC PHASE II RECOVERY - FIRST 15 MIN: Performed by: INTERNAL MEDICINE

## 2019-12-26 RX ORDER — SODIUM CHLORIDE 9 MG/ML
INJECTION, SOLUTION INTRAVENOUS CONTINUOUS PRN
Status: DISCONTINUED | OUTPATIENT
Start: 2019-12-26 | End: 2019-12-26 | Stop reason: SDUPTHER

## 2019-12-26 RX ORDER — PROPOFOL 10 MG/ML
INJECTION, EMULSION INTRAVENOUS PRN
Status: DISCONTINUED | OUTPATIENT
Start: 2019-12-26 | End: 2019-12-26 | Stop reason: SDUPTHER

## 2019-12-26 RX ORDER — LIDOCAINE HYDROCHLORIDE 20 MG/ML
SOLUTION OROPHARYNGEAL PRN
Status: DISCONTINUED | OUTPATIENT
Start: 2019-12-26 | End: 2019-12-26 | Stop reason: ALTCHOICE

## 2019-12-26 RX ORDER — LIDOCAINE HYDROCHLORIDE 20 MG/ML
INJECTION, SOLUTION EPIDURAL; INFILTRATION; INTRACAUDAL; PERINEURAL PRN
Status: DISCONTINUED | OUTPATIENT
Start: 2019-12-26 | End: 2019-12-26 | Stop reason: ALTCHOICE

## 2019-12-26 RX ADMIN — SODIUM CHLORIDE: 9 INJECTION, SOLUTION INTRAVENOUS at 10:29

## 2019-12-26 RX ADMIN — PROPOFOL 140 MG: 10 INJECTION, EMULSION INTRAVENOUS at 10:35

## 2019-12-26 ASSESSMENT — PAIN SCALES - GENERAL
PAINLEVEL_OUTOF10: 0

## 2019-12-26 ASSESSMENT — PAIN - FUNCTIONAL ASSESSMENT: PAIN_FUNCTIONAL_ASSESSMENT: 0-10

## 2019-12-26 ASSESSMENT — ENCOUNTER SYMPTOMS: SHORTNESS OF BREATH: 1

## 2019-12-27 LAB
APPEARANCE FLUID: ABNORMAL
APPEARANCE FLUID: NORMAL
BASO FLUID: 0 %
BASO FLUID: 0 %
CELL COUNT FLUID TYPE: ABNORMAL
CELL COUNT FLUID TYPE: NORMAL
COLOR FLUID: COLORLESS
COLOR FLUID: COLORLESS
EOSINOPHIL FLUID: 0 %
EOSINOPHIL FLUID: 2 %
GRAM STAIN ORDERABLE: NORMAL
GRAM STAIN ORDERABLE: NORMAL
LYMPHOCYTES, BODY FLUID: 0 %
LYMPHOCYTES, BODY FLUID: 5 %
MONOCYTE, FLUID: 1 %
MONOCYTE, FLUID: 2 %
NEUTROPHIL, FLUID: 93 %
NEUTROPHIL, FLUID: 97 %
NUCLEATED CELLS FLUID: ABNORMAL /UL
NUCLEATED CELLS FLUID: NORMAL /UL
RBC FLUID: 9350 /UL
RBC FLUID: ABNORMAL /UL

## 2019-12-29 LAB
CULTURE, RESPIRATORY: ABNORMAL
ORGANISM: ABNORMAL
ORGANISM: ABNORMAL
SMEAR, RESPIRATORY: ABNORMAL
SMEAR, RESPIRATORY: ABNORMAL

## 2020-01-03 ENCOUNTER — TELEPHONE (OUTPATIENT)
Dept: NON INVASIVE DIAGNOSTICS | Age: 80
End: 2020-01-03

## 2020-01-03 NOTE — TELEPHONE ENCOUNTER
We have received your remote transmission. Our staff will contact you if there is anything that needs to be discussed. Your next appointment is 01/13/2020 remote transmission from home    Pt is non-wireless    Left detailed message that we received the remote transmission. Gave the next transmission date, and reminded the patient to keep the box plugged in at all times. Instructed them to call the office if they had any further questions.

## 2020-01-03 NOTE — PROGRESS NOTES
See [employer] [place] report. Remote monitoring reviewed over a 30 day period. End of 30 day monitoring period date of service 12.12.2019.

## 2020-01-07 PROBLEM — J15.1 PSEUDOMONAS PNEUMONIA (HCC): Status: ACTIVE | Noted: 2020-01-07

## 2020-01-09 ENCOUNTER — HOSPITAL ENCOUNTER (OUTPATIENT)
Dept: INFUSION THERAPY | Age: 80
Setting detail: INFUSION SERIES
Discharge: HOME | End: 2020-01-09
Payer: MEDICARE

## 2020-01-09 VITALS
RESPIRATION RATE: 18 BRPM | HEART RATE: 72 BPM | OXYGEN SATURATION: 97 % | WEIGHT: 130 LBS | TEMPERATURE: 97.9 F | SYSTOLIC BLOOD PRESSURE: 128 MMHG | DIASTOLIC BLOOD PRESSURE: 62 MMHG | BODY MASS INDEX: 16.69 KG/M2

## 2020-01-09 PROCEDURE — 2580000003 HC RX 258: Performed by: SPECIALIST

## 2020-01-09 PROCEDURE — C1751 CATH, INF, PER/CENT/MIDLINE: HCPCS

## 2020-01-09 PROCEDURE — 36569 INSJ PICC 5 YR+ W/O IMAGING: CPT

## 2020-01-09 PROCEDURE — 6360000002 HC RX W HCPCS: Performed by: SPECIALIST

## 2020-01-09 PROCEDURE — 2500000003 HC RX 250 WO HCPCS: Performed by: SPECIALIST

## 2020-01-09 PROCEDURE — 76937 US GUIDE VASCULAR ACCESS: CPT

## 2020-01-09 RX ORDER — SODIUM CHLORIDE 0.9 % (FLUSH) 0.9 %
10 SYRINGE (ML) INJECTION PRN
Status: DISCONTINUED | OUTPATIENT
Start: 2020-01-09 | End: 2020-01-10 | Stop reason: HOSPADM

## 2020-01-09 RX ORDER — LIDOCAINE HYDROCHLORIDE 10 MG/ML
5 INJECTION, SOLUTION EPIDURAL; INFILTRATION; INTRACAUDAL; PERINEURAL ONCE
Status: COMPLETED | OUTPATIENT
Start: 2020-01-09 | End: 2020-01-09

## 2020-01-09 RX ORDER — HEPARIN SODIUM (PORCINE) LOCK FLUSH IV SOLN 100 UNIT/ML 100 UNIT/ML
3 SOLUTION INTRAVENOUS EVERY 12 HOURS SCHEDULED
Status: DISCONTINUED | OUTPATIENT
Start: 2020-01-09 | End: 2020-01-10 | Stop reason: HOSPADM

## 2020-01-09 RX ORDER — HEPARIN SODIUM (PORCINE) LOCK FLUSH IV SOLN 100 UNIT/ML 100 UNIT/ML
3 SOLUTION INTRAVENOUS PRN
Status: DISCONTINUED | OUTPATIENT
Start: 2020-01-09 | End: 2020-01-10 | Stop reason: HOSPADM

## 2020-01-09 RX ADMIN — Medication 10 ML: at 14:40

## 2020-01-09 RX ADMIN — LIDOCAINE HYDROCHLORIDE 0.5 ML: 10 INJECTION, SOLUTION EPIDURAL; INFILTRATION; INTRACAUDAL; PERINEURAL at 13:52

## 2020-01-09 RX ADMIN — Medication 30 ML: at 13:57

## 2020-01-09 RX ADMIN — Medication 300 UNITS: at 14:41

## 2020-01-09 ASSESSMENT — PAIN DESCRIPTION - FREQUENCY: FREQUENCY: INTERMITTENT

## 2020-01-09 ASSESSMENT — PAIN DESCRIPTION - ONSET: ONSET: ON-GOING

## 2020-01-09 ASSESSMENT — PAIN DESCRIPTION - PROGRESSION: CLINICAL_PROGRESSION: NOT CHANGED

## 2020-01-09 ASSESSMENT — PAIN SCALES - GENERAL: PAINLEVEL_OUTOF10: 0

## 2020-01-09 NOTE — PROGRESS NOTES
Pt returned from PICC line insertion. Vital signs stable , line flushed per protocol and covered.  Discharge bullseye given to pt

## 2020-01-09 NOTE — PROCEDURES
PICC    Catheter insertion date 1/9/2020  1357   Product Number:  [id number]   Lot No: [id number]   Gauge: 4FR   Lumen: SINGLE   R Basilic    Vein Diameter : 0.46CM   Upper arm circumference (10CM ABOVE AC): 22CM   Catheter Length : 44CM   Internal Length: 43,5CM   External Catheter Length: 0.5CM   Ultrasound Used:YES  VPS Blue Bullseye confirms PICC tip is placed in the lower 1/3 of the SVC or at the Cavoatrial junction. Floor nurse notified PICC is okay to use.    : Maritza Lemons RN, CPUI, [facility]-BC

## 2020-01-13 ENCOUNTER — NURSE ONLY (OUTPATIENT)
Dept: NON INVASIVE DIAGNOSTICS | Age: 80
End: 2020-01-13
Payer: MEDICARE

## 2020-01-13 PROCEDURE — 93298 REM INTERROG DEV EVAL SCRMS: CPT | Performed by: INTERNAL MEDICINE

## 2020-01-20 ENCOUNTER — HOSPITAL ENCOUNTER (OUTPATIENT)
Age: 80
Discharge: HOME | End: 2020-01-22
Payer: MEDICARE

## 2020-01-20 LAB
ALBUMIN SERPL-MCNC: 3.4 G/DL (ref 3.5–5.2)
ALP BLD-CCNC: 135 U/L (ref 40–129)
ALT SERPL-CCNC: 12 U/L (ref 0–40)
AST SERPL-CCNC: 15 U/L (ref 0–39)
BASOPHILS ABSOLUTE: 0.12 E9/L (ref 0–0.2)
BASOPHILS RELATIVE PERCENT: 1 % (ref 0–2)
BILIRUB SERPL-MCNC: <0.2 MG/DL (ref 0–1.2)
BILIRUBIN DIRECT: <0.2 MG/DL (ref 0–0.3)
BILIRUBIN, INDIRECT: ABNORMAL MG/DL (ref 0–1)
BUN BLDV-MCNC: 17 MG/DL (ref 8–23)
C-REACTIVE PROTEIN: 1 MG/DL (ref 0–0.4)
CREAT SERPL-MCNC: 0.9 MG/DL (ref 0.7–1.2)
EOSINOPHILS ABSOLUTE: 0.98 E9/L (ref 0.05–0.5)
EOSINOPHILS RELATIVE PERCENT: 8 % (ref 0–6)
GFR AFRICAN AMERICAN: >60
GFR NON-AFRICAN AMERICAN: >60 ML/MIN/1.73
HCT VFR BLD CALC: 34.9 % (ref 37–54)
HEMOGLOBIN: 10.4 G/DL (ref 12.5–16.5)
IMMATURE GRANULOCYTES #: 0.06 E9/L
IMMATURE GRANULOCYTES %: 0.5 % (ref 0–5)
LYMPHOCYTES ABSOLUTE: 1.46 E9/L (ref 1.5–4)
LYMPHOCYTES RELATIVE PERCENT: 11.9 % (ref 20–42)
MCH RBC QN AUTO: 26.2 PG (ref 26–35)
MCHC RBC AUTO-ENTMCNC: 29.8 % (ref 32–34.5)
MCV RBC AUTO: 87.9 FL (ref 80–99.9)
MONOCYTES ABSOLUTE: 0.76 E9/L (ref 0.1–0.95)
MONOCYTES RELATIVE PERCENT: 6.2 % (ref 2–12)
NEUTROPHILS ABSOLUTE: 8.87 E9/L (ref 1.8–7.3)
NEUTROPHILS RELATIVE PERCENT: 72.4 % (ref 43–80)
PDW BLD-RTO: 16.1 FL (ref 11.5–15)
PLATELET # BLD: 221 E9/L (ref 130–450)
PMV BLD AUTO: 11.3 FL (ref 7–12)
RBC # BLD: 3.97 E12/L (ref 3.8–5.8)
SEDIMENTATION RATE, ERYTHROCYTE: 26 MM/HR (ref 0–15)
TOTAL PROTEIN: 6.2 G/DL (ref 6.4–8.3)
WBC # BLD: 12.3 E9/L (ref 4.5–11.5)

## 2020-01-20 PROCEDURE — 85025 COMPLETE CBC W/AUTO DIFF WBC: CPT

## 2020-01-20 PROCEDURE — 86140 C-REACTIVE PROTEIN: CPT

## 2020-01-20 PROCEDURE — 85651 RBC SED RATE NONAUTOMATED: CPT

## 2020-01-20 PROCEDURE — 84520 ASSAY OF UREA NITROGEN: CPT

## 2020-01-20 PROCEDURE — 80076 HEPATIC FUNCTION PANEL: CPT

## 2020-01-20 PROCEDURE — 82565 ASSAY OF CREATININE: CPT

## 2020-01-27 LAB
FUNGUS (MYCOLOGY) CULTURE: NORMAL
FUNGUS (MYCOLOGY) CULTURE: NORMAL
FUNGUS STAIN: NORMAL
FUNGUS STAIN: NORMAL

## 2020-02-11 LAB
AFB CULTURE (MYCOBACTERIA): NORMAL
AFB CULTURE (MYCOBACTERIA): NORMAL
AFB SMEAR: NORMAL
AFB SMEAR: NORMAL

## 2020-02-12 ENCOUNTER — TELEPHONE (OUTPATIENT)
Dept: NON INVASIVE DIAGNOSTICS | Age: 80
End: 2020-02-12

## 2020-02-14 ENCOUNTER — NURSE ONLY (OUTPATIENT)
Dept: NON INVASIVE DIAGNOSTICS | Age: 80
End: 2020-02-14
Payer: MEDICARE

## 2020-02-14 PROCEDURE — 93298 REM INTERROG DEV EVAL SCRMS: CPT | Performed by: INTERNAL MEDICINE

## 2020-02-21 NOTE — PROGRESS NOTES
See [facility] report. Remote monitoring reviewed over a 30 day period. End of 30 day monitoring period date of service 2-. Spoke with patient regarding successful transmission and next remote date. He reports no symptoms.      Raheem Jaeger RN, BSN  [facility]

## 2020-03-09 ENCOUNTER — HOSPITAL ENCOUNTER (OUTPATIENT)
Age: 80
Discharge: HOME | End: 2020-03-09
Payer: MEDICARE

## 2020-03-09 LAB
ALBUMIN SERPL-MCNC: 3.9 G/DL (ref 3.5–5.2)
ALP BLD-CCNC: 123 U/L (ref 40–129)
ALT SERPL-CCNC: 10 U/L (ref 0–40)
ANION GAP SERPL CALCULATED.3IONS-SCNC: 10 MMOL/L (ref 7–16)
AST SERPL-CCNC: 18 U/L (ref 0–39)
BASOPHILS ABSOLUTE: 0.06 E9/L (ref 0–0.2)
BASOPHILS RELATIVE PERCENT: 0.4 % (ref 0–2)
BILIRUB SERPL-MCNC: 0.3 MG/DL (ref 0–1.2)
BUN BLDV-MCNC: 17 MG/DL (ref 8–23)
C-REACTIVE PROTEIN: 5.9 MG/DL (ref 0–0.4)
CALCIUM SERPL-MCNC: 9.3 MG/DL (ref 8.6–10.2)
CHLORIDE BLD-SCNC: 101 MMOL/L (ref 98–107)
CO2: 28 MMOL/L (ref 22–29)
CREAT SERPL-MCNC: 1 MG/DL (ref 0.7–1.2)
EOSINOPHILS ABSOLUTE: 0.22 E9/L (ref 0.05–0.5)
EOSINOPHILS RELATIVE PERCENT: 1.6 % (ref 0–6)
GFR AFRICAN AMERICAN: >60
GFR NON-AFRICAN AMERICAN: >60 ML/MIN/1.73
GLUCOSE BLD-MCNC: 103 MG/DL (ref 74–99)
HCT VFR BLD CALC: 37.8 % (ref 37–54)
HEMOGLOBIN: 11.8 G/DL (ref 12.5–16.5)
IMMATURE GRANULOCYTES #: 0.05 E9/L
IMMATURE GRANULOCYTES %: 0.4 % (ref 0–5)
LYMPHOCYTES ABSOLUTE: 1.35 E9/L (ref 1.5–4)
LYMPHOCYTES RELATIVE PERCENT: 9.9 % (ref 20–42)
MCH RBC QN AUTO: 27.8 PG (ref 26–35)
MCHC RBC AUTO-ENTMCNC: 31.2 % (ref 32–34.5)
MCV RBC AUTO: 89.2 FL (ref 80–99.9)
MONOCYTES ABSOLUTE: 0.8 E9/L (ref 0.1–0.95)
MONOCYTES RELATIVE PERCENT: 5.9 % (ref 2–12)
NEUTROPHILS ABSOLUTE: 11.09 E9/L (ref 1.8–7.3)
NEUTROPHILS RELATIVE PERCENT: 81.8 % (ref 43–80)
PDW BLD-RTO: 14.9 FL (ref 11.5–15)
PLATELET # BLD: 264 E9/L (ref 130–450)
PMV BLD AUTO: 10.6 FL (ref 7–12)
POTASSIUM SERPL-SCNC: 4.4 MMOL/L (ref 3.5–5)
RBC # BLD: 4.24 E12/L (ref 3.8–5.8)
SEDIMENTATION RATE, ERYTHROCYTE: 37 MM/HR (ref 0–15)
SODIUM BLD-SCNC: 139 MMOL/L (ref 132–146)
TOTAL PROTEIN: 7.4 G/DL (ref 6.4–8.3)
WBC # BLD: 13.6 E9/L (ref 4.5–11.5)

## 2020-03-09 PROCEDURE — 87305 ASPERGILLUS AG IA: CPT

## 2020-03-09 PROCEDURE — 80053 COMPREHEN METABOLIC PANEL: CPT

## 2020-03-09 PROCEDURE — 86140 C-REACTIVE PROTEIN: CPT

## 2020-03-09 PROCEDURE — 85651 RBC SED RATE NONAUTOMATED: CPT

## 2020-03-09 PROCEDURE — 85025 COMPLETE CBC W/AUTO DIFF WBC: CPT

## 2020-03-09 PROCEDURE — 36415 COLL VENOUS BLD VENIPUNCTURE: CPT

## 2020-03-11 LAB
ASPERGILLUS GALACTO AG: NEGATIVE
ASPERGILLUS GALACTO INDEX: 0.05

## 2020-03-16 ENCOUNTER — NURSE ONLY (OUTPATIENT)
Dept: NON INVASIVE DIAGNOSTICS | Age: 80
End: 2020-03-16
Payer: MEDICARE

## 2020-03-16 PROCEDURE — 93298 REM INTERROG DEV EVAL SCRMS: CPT | Performed by: INTERNAL MEDICINE

## 2020-03-16 PROCEDURE — G2066 INTER DEVC REMOTE 30D: HCPCS | Performed by: INTERNAL MEDICINE

## 2020-03-23 ENCOUNTER — TELEPHONE (OUTPATIENT)
Dept: NON INVASIVE DIAGNOSTICS | Age: 80
End: 2020-03-23

## 2020-03-23 NOTE — TELEPHONE ENCOUNTER
----- Message from Marguerite Cary RN sent at 3/23/2020 10:55 AM EDT -----  Successful transmission received. Please call patient and give next appointment. 15-Dontae-2017 05:16

## 2020-04-20 ENCOUNTER — NURSE ONLY (OUTPATIENT)
Dept: NON INVASIVE DIAGNOSTICS | Age: 80
End: 2020-04-20
Payer: MEDICARE

## 2020-04-20 PROCEDURE — 93298 REM INTERROG DEV EVAL SCRMS: CPT | Performed by: INTERNAL MEDICINE

## 2020-04-20 PROCEDURE — G2066 INTER DEVC REMOTE 30D: HCPCS | Performed by: INTERNAL MEDICINE

## 2020-05-22 ENCOUNTER — NURSE ONLY (OUTPATIENT)
Dept: NON INVASIVE DIAGNOSTICS | Age: 80
End: 2020-05-22
Payer: MEDICARE

## 2020-05-22 PROCEDURE — G2066 INTER DEVC REMOTE 30D: HCPCS | Performed by: INTERNAL MEDICINE

## 2020-05-22 PROCEDURE — 93298 REM INTERROG DEV EVAL SCRMS: CPT | Performed by: INTERNAL MEDICINE

## 2020-05-22 NOTE — PROGRESS NOTES
See [employer] [place] report. Remote monitoring reviewed over a 30 day period.   End of 30 day monitoring period date of service 05/22/2020

## 2020-06-11 NOTE — CARE COORDINATION
Social work followed up with patient and wife in room. confirmed plan remains home when medically ready and wife to transport.   Electronically signed by LIZ Jimenez on 8/14/2019 at 11:41 AM Pt is able to work and to function when treatment compliant and abstinent from substances of potential abuse

## 2020-06-23 ENCOUNTER — NURSE ONLY (OUTPATIENT)
Dept: NON INVASIVE DIAGNOSTICS | Age: 80
End: 2020-06-23
Payer: MEDICARE

## 2020-06-23 PROCEDURE — G2066 INTER DEVC REMOTE 30D: HCPCS | Performed by: INTERNAL MEDICINE

## 2020-06-23 PROCEDURE — 93298 REM INTERROG DEV EVAL SCRMS: CPT | Performed by: INTERNAL MEDICINE

## 2020-06-24 ENCOUNTER — TELEPHONE (OUTPATIENT)
Dept: NON INVASIVE DIAGNOSTICS | Age: 80
End: 2020-06-24

## 2020-06-24 NOTE — TELEPHONE ENCOUNTER
Phone call from patient stating he is to wear a special vest for 10 minutes twice a day that vibrates for his COPD issues. Asking is ok with implantable ILR. Informed him ok to use.       Janine Medley RN, BSN  [facility]

## 2020-06-30 ENCOUNTER — OFFICE VISIT (OUTPATIENT)
Dept: VASCULAR SURGERY | Age: 80
End: 2020-06-30
Payer: MEDICARE

## 2020-06-30 VITALS — HEIGHT: 74 IN | WEIGHT: 129 LBS | BODY MASS INDEX: 16.55 KG/M2 | RESPIRATION RATE: 16 BRPM

## 2020-06-30 PROCEDURE — 1123F ACP DISCUSS/DSCN MKR DOCD: CPT | Performed by: SURGERY

## 2020-06-30 PROCEDURE — G8419 CALC BMI OUT NRM PARAM NOF/U: HCPCS | Performed by: SURGERY

## 2020-06-30 PROCEDURE — 99204 OFFICE O/P NEW MOD 45 MIN: CPT | Performed by: SURGERY

## 2020-06-30 PROCEDURE — G8427 DOCREV CUR MEDS BY ELIG CLIN: HCPCS | Performed by: SURGERY

## 2020-06-30 PROCEDURE — 4040F PNEUMOC VAC/ADMIN/RCVD: CPT | Performed by: SURGERY

## 2020-06-30 PROCEDURE — 1036F TOBACCO NON-USER: CPT | Performed by: SURGERY

## 2020-06-30 RX ORDER — MELOXICAM 15 MG/1
15 TABLET ORAL EVERY OTHER DAY
COMMUNITY

## 2020-06-30 NOTE — PROGRESS NOTES
MG tablet Take 15 mg by mouth daily   Yes Historical Provider, MD   Arformoterol Tartrate (BROVANA) 15 MCG/2ML NEBU Take 1 ampule by nebulization 2 times daily 4/6/20  Yes Ria Restrepo MD   fluconazole (DIFLUCAN) 200 MG tablet Take 1 tablet by mouth daily 1 tablet daily for 7 days 2/24/20  Yes XAVI Allen CNP   ceFEPIme (MAXIPIME) 2 g injection  2/5/20  Yes Historical Provider, MD   azithromycin (ZITHROMAX) 250 MG tablet  1/30/20  Yes Historical Provider, MD   ferrous sulfate 325 (65 Fe) MG tablet TAKE ONE TABLET BY MOUTH EVERY DAY 12/29/19  Yes Historical Provider, MD   SOLU-CORTEF 100 MG injection  1/8/20  Yes Historical Provider, MD   ipratropium-albuterol (Nikunj Heck) 0.5-2.5 (3) MG/3ML SOLN nebulizer solution inhale one vial via nebulizer twice daily as needed 1/2/20  Yes Historical Provider, MD   levofloxacin (LEVAQUIN) 750 MG tablet TAKE ONE TABLET BY MOUTH DAILY 1/7/20  Yes Historical Provider, MD   Revefenacin (Sergey Vahid) 175 MCG/3ML SOLN Take 1 vial by nebulization daily  Patient taking differently: Take 1 vial by nebulization Daily with lunch  12/2/19  Yes XAVI Allen CNP   atorvastatin (LIPITOR) 40 MG tablet Take by mouth 8/14/19  Yes Historical Provider, MD   methylPREDNISolone (MEDROL DOSEPACK) 4 MG tablet NOT DUE UNTIL THE FIRST OF THE MONTH 9/1/19  Yes Historical Provider, MD   aspirin 81 MG tablet Take 2 tablets by mouth daily 8/14/19  Yes Mendel Hope, MD   sennosides-docusate sodium (SENOKOT-S) 8.6-50 MG tablet Take 2 tablets by mouth nightly as needed for Constipation 8/14/19  Yes Mendel Hope, MD   OXYGEN Inhale into the lungs continuous 2 lpm   Yes Historical Provider, MD   tamsulosin (FLOMAX) 0.4 MG capsule Take 0.4 mg by mouth daily   Yes Historical Provider, MD   alendronate (FOSAMAX) 70 MG tablet TAKE ONE TABLET BY MOUTH ONCE A WEEK 8/18/18  Yes Historical Provider, MD   metoprolol tartrate (LOPRESSOR) 25 MG tablet Take 25 mg by mouth daily   Yes

## 2020-07-27 ENCOUNTER — NURSE ONLY (OUTPATIENT)
Dept: NON INVASIVE DIAGNOSTICS | Age: 80
End: 2020-07-27
Payer: MEDICARE

## 2020-07-27 PROCEDURE — 93298 REM INTERROG DEV EVAL SCRMS: CPT | Performed by: INTERNAL MEDICINE

## 2020-07-27 PROCEDURE — G2066 INTER DEVC REMOTE 30D: HCPCS | Performed by: INTERNAL MEDICINE

## 2020-08-05 ENCOUNTER — HOSPITAL ENCOUNTER (OUTPATIENT)
Age: 80
Discharge: HOME | End: 2020-08-07

## 2020-08-05 PROCEDURE — 87081 CULTURE SCREEN ONLY: CPT

## 2020-08-05 PROCEDURE — 88305 TISSUE EXAM BY PATHOLOGIST: CPT

## 2020-08-06 LAB — CLOTEST: NORMAL

## 2020-08-19 ENCOUNTER — TELEPHONE (OUTPATIENT)
Dept: NON INVASIVE DIAGNOSTICS | Age: 80
End: 2020-08-19

## 2020-08-19 NOTE — TELEPHONE ENCOUNTER
----- Message from Kaelyn Johansen RN sent at 6/30/2020  9:54 AM EDT -----  Successful transmission received. Please call patient and give next appointment.

## 2020-08-19 NOTE — TELEPHONE ENCOUNTER
We have received your remote transmission. Our staff will contact you if there is anything that needs to be discussed. Your next appointment is 08/28/2020 remote transmission from home. Spoke to patient. Verbalized understanding of next transmission date.

## 2020-08-19 NOTE — TELEPHONE ENCOUNTER
----- Message from Mika Velazco RN sent at 8/3/2020 12:58 PM EDT -----  Successful transmission received. Please call patient and give next appointment.

## 2020-08-28 ENCOUNTER — NURSE ONLY (OUTPATIENT)
Dept: NON INVASIVE DIAGNOSTICS | Age: 80
End: 2020-08-28
Payer: MEDICARE

## 2020-08-28 PROCEDURE — G2066 INTER DEVC REMOTE 30D: HCPCS | Performed by: INTERNAL MEDICINE

## 2020-08-28 PROCEDURE — 93298 REM INTERROG DEV EVAL SCRMS: CPT | Performed by: INTERNAL MEDICINE

## 2020-09-01 ENCOUNTER — TELEPHONE (OUTPATIENT)
Dept: NON INVASIVE DIAGNOSTICS | Age: 80
End: 2020-09-01

## 2020-09-01 NOTE — PROGRESS NOTES
See [employer] [place] report. Remote monitoring reviewed over a 30 day period. End of 30 day monitoring period date of service 8-.

## 2020-09-01 NOTE — TELEPHONE ENCOUNTER
----- Message from Gypsy Camacho RN sent at 9/1/2020  9:02 AM EDT -----  Successful transmission received. Please call patient and give next appointment.

## 2020-09-30 ENCOUNTER — NURSE ONLY (OUTPATIENT)
Dept: NON INVASIVE DIAGNOSTICS | Age: 80
End: 2020-09-30
Payer: MEDICARE

## 2020-09-30 PROCEDURE — G2066 INTER DEVC REMOTE 30D: HCPCS | Performed by: INTERNAL MEDICINE

## 2020-09-30 PROCEDURE — 93298 REM INTERROG DEV EVAL SCRMS: CPT | Performed by: INTERNAL MEDICINE

## 2020-10-01 NOTE — PROGRESS NOTES
See [employer] report. Remote monitoring reviewed over a 30 day period. End of 30 day monitoring period date of service 9-    [employer] ILR  DOI: 8-  Battery status: OK  Presenting rhythm: appears SR/PAC  Arrhythmia: 9/19/20: 6 episodes   -5: occurring 12:06-12:10 lasting 23 seconds to 1 min 8 seconds (artifact as well as undersensing) pt wearing \"vest\" from pulmonary  Patient triggered event: 1 (9/11/20) 14:38 >> appears SR/PAC     Comment:  -h/o TIAs; PAF 2013 treated for pneumonia   -[id number] score: 6 (TIA, age, HTN, vascular disease)  -PVI 2011  -AAA with EVAR 2018  -Lung CA with lobectomy 2009  -h/o hemoptysis with embolization of bronchial artery 2012  -pneumonia/bronchoscopy 12/26/19 (follows with Mango Banda and Farzaneh Tsai)  -9/22/2020 (Dr Kanu Estrada):  Desaturation with exertion; bronchodilators and vest BID (and as needed)  -10/1/2020: attempted to call patient re: triggered symptomatic episode >> no answer     Medications  -Lopressor 25 mg QD  -Lipitor 40 mg QD  -ASA 81 mg QD  -Ferrous sulfate 325 mg QD        Plan:  -31 day remote transmission  -OV recall with Dr Martin Holder (scheduling)  -will forward to Dr Cris Green, APRN-[facility], [address] [facility]  -patient returned call today. Accidentally \"hit\" button on patient activator; no symptoms.    -he is wearing the \"vest\" from Dr Jose Antonio Moody office  -he is feeling well with no complaints.  -Dr Martin Holder reviewed- no changes at this time    XAVI Palomares-CNP, [address] [facility]

## 2020-10-27 ENCOUNTER — TELEPHONE (OUTPATIENT)
Dept: NON INVASIVE DIAGNOSTICS | Age: 80
End: 2020-10-27

## 2020-10-27 NOTE — TELEPHONE ENCOUNTER
We have received your remote transmission. Our staff will contact you if there is anything that needs to be discussed. Your next appointment is 11/02/2020 remote transmission from home. Spoke to patient. Verbalized understanding of next transmission date.

## 2020-10-27 NOTE — TELEPHONE ENCOUNTER
----- Message from Nicolás Holliday RN sent at 10/21/2020  9:38 AM EDT -----  Successful transmission received. Please call patient and give next appointment.

## 2020-11-03 ENCOUNTER — NURSE ONLY (OUTPATIENT)
Dept: NON INVASIVE DIAGNOSTICS | Age: 80
End: 2020-11-03
Payer: MEDICARE

## 2020-11-03 PROCEDURE — 93298 REM INTERROG DEV EVAL SCRMS: CPT | Performed by: INTERNAL MEDICINE

## 2020-11-03 PROCEDURE — G2066 INTER DEVC REMOTE 30D: HCPCS | Performed by: INTERNAL MEDICINE

## 2020-11-05 ENCOUNTER — TELEPHONE (OUTPATIENT)
Dept: ENT CLINIC | Age: 80
End: 2020-11-05

## 2020-11-05 NOTE — TELEPHONE ENCOUNTER
Pt had throat Sx in [place] (Dr. Oleg Mares) for throat CA. Pt is now having \"hoarseness\", and would like to be a new pt. Pt is now a resident of [facility], and would like to be seen at your office.  He can be seen at [phone]

## 2020-11-05 NOTE — TELEPHONE ENCOUNTER
Called patient in regards to scheduling appointment for hoarseness, patient had throat cancer surgery 2011 and moved to [facility] and would like an ENT closer to home. Patient will obtain a referral and have chart notes sent to [facility] ENT to see if he can be seen as a new patient.

## 2020-11-11 NOTE — PROGRESS NOTES
See [employer] report. Remote monitoring reviewed over a 30 day period.   End of 30 day monitoring period date of service 11/03/2020

## 2020-12-10 ENCOUNTER — TELEPHONE (OUTPATIENT)
Dept: VASCULAR SURGERY | Age: 80
End: 2020-12-10

## 2021-01-01 ENCOUNTER — TELEPHONE (OUTPATIENT)
Dept: NON INVASIVE DIAGNOSTICS | Age: 81
End: 2021-01-01

## 2021-01-04 ENCOUNTER — NURSE ONLY (OUTPATIENT)
Dept: NON INVASIVE DIAGNOSTICS | Age: 81
End: 2021-01-04
Payer: MEDICARE

## 2021-01-04 DIAGNOSIS — I48.0 PAROXYSMAL ATRIAL FIBRILLATION (HCC): ICD-10-CM

## 2021-01-04 DIAGNOSIS — Z95.818 STATUS POST PLACEMENT OF IMPLANTABLE LOOP RECORDER: Primary | ICD-10-CM

## 2021-01-04 PROCEDURE — G2066 INTER DEVC REMOTE 30D: HCPCS | Performed by: INTERNAL MEDICINE

## 2021-01-04 PROCEDURE — 93298 REM INTERROG DEV EVAL SCRMS: CPT | Performed by: INTERNAL MEDICINE

## 2021-01-11 ENCOUNTER — TELEPHONE (OUTPATIENT)
Dept: VASCULAR SURGERY | Age: 81
End: 2021-01-11

## 2021-01-25 ENCOUNTER — TELEPHONE (OUTPATIENT)
Dept: VASCULAR SURGERY | Age: 81
End: 2021-01-25

## 2021-01-27 ENCOUNTER — OFFICE VISIT (OUTPATIENT)
Dept: ENT CLINIC | Age: 81
End: 2021-01-27
Payer: MEDICARE

## 2021-01-27 VITALS
OXYGEN SATURATION: 94 % | TEMPERATURE: 97.6 F | BODY MASS INDEX: 17.32 KG/M2 | WEIGHT: 135 LBS | SYSTOLIC BLOOD PRESSURE: 143 MMHG | HEIGHT: 74 IN | HEART RATE: 84 BPM | RESPIRATION RATE: 20 BRPM | DIASTOLIC BLOOD PRESSURE: 90 MMHG

## 2021-01-27 DIAGNOSIS — R49.0 HOARSENESS OF VOICE: Primary | ICD-10-CM

## 2021-01-27 DIAGNOSIS — R13.12 OROPHARYNGEAL DYSPHAGIA: ICD-10-CM

## 2021-01-27 PROCEDURE — G8419 CALC BMI OUT NRM PARAM NOF/U: HCPCS | Performed by: OTOLARYNGOLOGY

## 2021-01-27 PROCEDURE — 31575 DIAGNOSTIC LARYNGOSCOPY: CPT | Performed by: OTOLARYNGOLOGY

## 2021-01-27 PROCEDURE — G8484 FLU IMMUNIZE NO ADMIN: HCPCS | Performed by: OTOLARYNGOLOGY

## 2021-01-27 PROCEDURE — 1123F ACP DISCUSS/DSCN MKR DOCD: CPT | Performed by: OTOLARYNGOLOGY

## 2021-01-27 PROCEDURE — 99205 OFFICE O/P NEW HI 60 MIN: CPT | Performed by: OTOLARYNGOLOGY

## 2021-01-27 PROCEDURE — 1036F TOBACCO NON-USER: CPT | Performed by: OTOLARYNGOLOGY

## 2021-01-27 PROCEDURE — G8427 DOCREV CUR MEDS BY ELIG CLIN: HCPCS | Performed by: OTOLARYNGOLOGY

## 2021-01-27 PROCEDURE — 4040F PNEUMOC VAC/ADMIN/RCVD: CPT | Performed by: OTOLARYNGOLOGY

## 2021-01-27 ASSESSMENT — ENCOUNTER SYMPTOMS
CHEST TIGHTNESS: 0
COLOR CHANGE: 0
RESPIRATORY NEGATIVE: 1
VOMITING: 0
GASTROINTESTINAL NEGATIVE: 1
EYE DISCHARGE: 0
VOICE CHANGE: 1
EYES NEGATIVE: 1
SHORTNESS OF BREATH: 0
EYE PAIN: 0
TROUBLE SWALLOWING: 1
APNEA: 0
ABDOMINAL PAIN: 0
DIARRHEA: 0

## 2021-01-27 NOTE — PROGRESS NOTES
together: None     Attends Buddhist service: None     Active member of club or organization: None     Attends meetings of clubs or organizations: None     Relationship status: None    Intimate partner violence     Fear of current or ex partner: None     Emotionally abused: None     Physically abused: None     Forced sexual activity: None   Other Topics Concern    None   Social History Narrative    None           Past Medical History:   Diagnosis Date    AF (atrial fibrillation) ([facility] 2013    ONE EPISODE     Arthritis     Aspergillus ([facility] 2018    Asthma     CAD (coronary artery disease)     Cancer ([facility]     vocal cord, and lung    COPD (chronic obstructive pulmonary disease) ([facility]     Hyperlipidemia     Hypertension     Palpitations     Pneumonia due to Pseudomonas ([facility] 2019    SOB (shortness of breath)     TIA (transient ischemic attack) 08/2019     Past Surgical History:   Procedure Laterality Date    ABDOMINAL AORTIC ANEURYSM REPAIR, ENDOVASCULAR      APPENDECTOMY      BACK SURGERY      BRONCHOSCOPY N/A 2/22/2019    BRONCHOSCOPY WITH ANESTHESIA performed by Lacey Bahena MD at [address] [place] [address] N/A 12/26/2019    BRONCHOSCOPY BIOPSY BRONCHUS performed by Lacey Bahena MD at [facility] CATH LAB PROCEDURE      EYE SURGERY      INSERTABLE CARDIAC MONITOR  08/14/2019    LINQ LOOP RECORDER                 DR. Hung Horvath LUNG CANCER SURGERY      2009    PICC LINE INSERTION NURSE  1/9/2020         THROAT SURGERY      for cancer in 2016     Family History   Problem Relation Age of Onset    Other Mother     Other Father      Social History     Socioeconomic History    Marital status:      Spouse name: None    Number of children: None    Years of education: None    Highest education level: None   Occupational History    None   Social Needs    Financial resource strain: None    Food insecurity     Worry: None     Inability: None  Transportation needs     Medical: None     Non-medical: None   Tobacco Use    Smoking status: Former Smoker     Packs/day: 1.00     Years: 25.00     Pack years: 25.00     Types: Cigarettes     Quit date:      Years since quittin.0    Smokeless tobacco: Never Used   Substance and Sexual Activity    Alcohol use: No    Drug use: No    Sexual activity: Never   Lifestyle    Physical activity     Days per week: None     Minutes per session: None    Stress: None   Relationships    Social connections     Talks on phone: None     Gets together: None     Attends Rastafari service: None     Active member of club or organization: None     Attends meetings of clubs or organizations: None     Relationship status: None    Intimate partner violence     Fear of current or ex partner: None     Emotionally abused: None     Physically abused: None     Forced sexual activity: None   Other Topics Concern    None   Social History Narrative    None     Allergies   Allergen Reactions    Sulfa Antibiotics Rash    Vancomycin Rash and Other (See Comments)    Trimethoprim          Review of Systems   Constitutional: Negative. Negative for appetite change. HENT: Positive for trouble swallowing and voice change. Eyes: Negative. Negative for pain, discharge and visual disturbance. Respiratory: Negative. Negative for apnea, chest tightness and shortness of breath. Cardiovascular: Negative. Negative for chest pain, palpitations and leg swelling. Gastrointestinal: Negative. Negative for abdominal pain, diarrhea and vomiting. Endocrine: Negative for cold intolerance, heat intolerance and polydipsia. Genitourinary: Negative. Negative for dysuria, flank pain and hematuria. Musculoskeletal: Negative. Negative for arthralgias, gait problem and neck pain. Skin: Negative. Negative for color change, pallor and rash.    Allergic/Immunologic: Negative for environmental allergies, food allergies and immunocompromised state. Neurological: Negative. Negative for dizziness, numbness and headaches. Hematological: Negative for adenopathy. Psychiatric/Behavioral: Negative. Negative for behavioral problems and hallucinations. All other systems reviewed and are negative. Objective:   Physical Exam  Vitals signs and nursing note reviewed. Constitutional:       Appearance: He is well-developed. HENT:      Head: Normocephalic and atraumatic. Right Ear: Hearing, tympanic membrane, ear canal and external ear normal.      Left Ear: Hearing, tympanic membrane, ear canal and external ear normal.      Nose: Nose normal.   Eyes:      Conjunctiva/sclera: Conjunctivae normal.      Pupils: Pupils are equal, round, and reactive to light. Neck:      Musculoskeletal: Normal range of motion and neck supple. Cardiovascular:      Rate and Rhythm: Normal rate and regular rhythm. Heart sounds: Normal heart sounds. Pulmonary:      Effort: Pulmonary effort is normal. No respiratory distress. Breath sounds: Normal breath sounds. Abdominal:      General: Bowel sounds are normal. There is no distension. Palpations: Abdomen is soft. Tenderness: There is no abdominal tenderness. There is no guarding. Musculoskeletal: Normal range of motion. Skin:     General: Skin is warm and dry. Neurological:      Mental Status: He is alert and oriented to person, place, and time. Psychiatric:         Behavior: Behavior normal.         Thought Content:  Thought content normal.         Judgment: Judgment normal.           Endoscopy Procedure Note    Pre-operative Diagnosis: hoarseness    Post-operative Diagnosis: same    Indications: Hoarseness, dysphagia or aspiration - not able to be clearly evaluated by indirect laryngoscopy    Anesthesia: Lidocaine 2% and Jeffrey-Synephrine 1/2%    Endoscopy Type:  nasal endoscopy, nasopharyngoscopy, laryngoscopy    Procedure Details   With the patient sitting upright in the examining chair informed consent was obtained. The bilateral side(s) of the nose were topically anesthetized with spray. After waiting an appropriate period of time for anesthesia/ vasoconstriction to become effective, the 0-degree  flexible laryngoscope was passed through the left side(s) of the nose, and the nose, nasopharynx, oropharynx, hypopharynx and larynx were examined. An identical procedure was performed on the contralateral side. Examination was performed during quiet respiration and with phonation. I was present for the entire procedure. The following findings were noted. Findings:  Mucosa:  pale and boggy and erythematous   Nasal septum:  normal   Turbinates:  pale, swollen, edematous   Adenoid:  normal   Eustachian tubes:  normal   Mucous stranding:  present   Lesions:  absent   Modified Kenny's Maneuver notindicated   Larynx Supraglottis, false and true vocal cord were normal.  Vocal cord mobility was normal.  Subglottis is patent. Condition:  Stable    Complications:  None    Pictures:                          Assessment:       Diagnosis Orders   1. Hoarseness of voice               Plan:     I will order a swallow study for the patient today.        Follow up in 2 week(s)

## 2021-02-03 ENCOUNTER — TELEPHONE (OUTPATIENT)
Dept: ENT CLINIC | Age: 81
End: 2021-02-03

## 2021-02-03 NOTE — TELEPHONE ENCOUNTER
[facility] to authorize order with patient insurance. Patient is scheduled for video swallow with radiology on 2/15/21 @ 10:00am Patient has been notified of date and time and that they need to arrive at 9:30am. Patient was informed he needs to have a light breakfast prior to procedure. Patient [facility] lot and report to registration. Patient verbalized understanding. [facility] to authorize order with patient insurance. Patient is scheduled for fl esophagram with radiology on 2/15/21 @ 11:00am Patient has been notified of date and time and that they need to arrive at 9:30am. Patient verbalized understanding.     Electronically signed by Fausto Dandy, CMA on 2/3/21 at 11:03 AM EST

## 2021-02-08 ENCOUNTER — TELEPHONE (OUTPATIENT)
Dept: NON INVASIVE DIAGNOSTICS | Age: 81
End: 2021-02-08

## 2021-02-09 ENCOUNTER — OFFICE VISIT (OUTPATIENT)
Dept: VASCULAR SURGERY | Age: 81
End: 2021-02-09
Payer: MEDICARE

## 2021-02-09 DIAGNOSIS — Z95.828 S/P AAA REPAIR USING BIFURCATION GRAFT: Primary | ICD-10-CM

## 2021-02-09 DIAGNOSIS — Z86.79 S/P AAA REPAIR USING BIFURCATION GRAFT: Primary | ICD-10-CM

## 2021-02-09 PROCEDURE — 4040F PNEUMOC VAC/ADMIN/RCVD: CPT | Performed by: SURGERY

## 2021-02-09 PROCEDURE — G8484 FLU IMMUNIZE NO ADMIN: HCPCS | Performed by: SURGERY

## 2021-02-09 PROCEDURE — G8419 CALC BMI OUT NRM PARAM NOF/U: HCPCS | Performed by: SURGERY

## 2021-02-09 PROCEDURE — G8427 DOCREV CUR MEDS BY ELIG CLIN: HCPCS | Performed by: SURGERY

## 2021-02-09 PROCEDURE — 1036F TOBACCO NON-USER: CPT | Performed by: SURGERY

## 2021-02-09 PROCEDURE — 99213 OFFICE O/P EST LOW 20 MIN: CPT | Performed by: SURGERY

## 2021-02-09 PROCEDURE — 1123F ACP DISCUSS/DSCN MKR DOCD: CPT | Performed by: SURGERY

## 2021-02-09 NOTE — PROGRESS NOTES
Vascular Surgery Outpatient Progress Note      Chief Complaint   Patient presents with    Circulatory Problem     Follow up AAA       HISTORY OF PRESENT ILLNESS:                The patient is a 79 y.o. male who returns for follow-up evaluation of previous endovascular abdominal aortic aneurysm repair. He is wearing oxygen concentrator full-time now. He denies any other problems other than his breathing. He had an ultrasound of the abdomen performed December 16, 2020 at [facility] that showed no evidence of endovascular leak in the aneurysm measuring 5.5 cm in maximum transverse diameter. Past Medical History:        Diagnosis Date    AF (atrial fibrillation) ([address] 2013    ONE EPISODE     Arthritis     Aspergillus ([address] 2018    Asthma     CAD (coronary artery disease)     Cancer ([facility]     vocal cord, and lung    COPD (chronic obstructive pulmonary disease) ([address]     Hyperlipidemia     Hypertension     Palpitations     Pneumonia due to Pseudomonas ([facility] 2019    SOB (shortness of breath)     TIA (transient ischemic attack) 08/2019     Past Surgical History:        Procedure Laterality Date    ABDOMINAL AORTIC ANEURYSM REPAIR, ENDOVASCULAR      APPENDECTOMY      BACK SURGERY      BRONCHOSCOPY N/A 2/22/2019    BRONCHOSCOPY WITH ANESTHESIA performed by Ric Garcia MD at [address] [place] [address] N/A 12/26/2019    BRONCHOSCOPY BIOPSY BRONCHUS performed by [facility] at [facility] CATH LAB PROCEDURE      EYE SURGERY      INSERTABLE CARDIAC MONITOR  08/14/2019    LINQ LOOP RECORDER                 DR. Irma Rick LUNG CANCER SURGERY      2009    PICC LINE INSERTION NURSE  1/9/2020         THROAT SURGERY      for cancer in 2016     Current Medications:   Prior to Admission medications    Medication Sig Start Date End Date Taking?  Authorizing Provider   Arformoterol Tartrate (BROVANA) 15 MCG/2ML NEBU Take 1 ampule by nebulization 2 times daily Indications: Chronic Obstructive Lung Disease 8/31/20  Yes Evie David MD   azithromycin [facility]) 250 MG tablet Take 1 tablet by mouth See Admin Instructions 7/2/20  Yes Evie David MD   meloxicam (MOBIC) 15 MG tablet Take 15 mg by mouth daily   Yes Historical Provider, MD   ipratropium-albuterol (Charol Rodes) 0.5-2.5 (3) MG/3ML SOLN nebulizer solution inhale one vial via nebulizer twice daily as needed 1/2/20  Yes Historical Provider, MD   Revefenacin (Johnye Dunker) 175 MCG/3ML SOLN Take 1 vial by nebulization daily  Patient taking differently: Take 1 vial by nebulization Daily with lunch  12/2/19  Yes XAVI Suarez - CNP   atorvastatin (LIPITOR) 40 MG tablet Take by mouth 8/14/19  Yes Historical Provider, MD   methylPREDNISolone (MEDROL DOSEPACK) 4 MG tablet NOT DUE UNTIL THE FIRST OF THE MONTH 9/1/19  Yes Historical Provider, MD   aspirin 81 MG tablet Take 2 tablets by mouth daily 8/14/19  Yes Olga Ayon MD   sennosides-docusate sodium (SENOKOT-S) 8.6-50 MG tablet Take 2 tablets by mouth nightly as needed for Constipation 8/14/19  Yes Olga Ayon MD   OXYGEN Inhale into the lungs continuous 2 lpm   Yes Historical Provider, MD   tamsulosin (FLOMAX) 0.4 MG capsule Take 0.4 mg by mouth daily   Yes Historical Provider, MD   alendronate (FOSAMAX) 70 MG tablet TAKE ONE TABLET BY MOUTH ONCE A WEEK 8/18/18  Yes Historical Provider, MD   metoprolol tartrate (LOPRESSOR) 25 MG tablet Take 25 mg by mouth daily   Yes Historical Provider, MD     Allergies:  Sulfa antibiotics, Vancomycin, and Trimethoprim    Social History     Socioeconomic History    Marital status:      Spouse name: Not on file    Number of children: Not on file    Years of education: Not on file    Highest education level: Not on file   Occupational History    Not on file   Social Needs    Financial resource strain: Not on file    Food insecurity     Worry: Not on file     Inability: Not on file   [facility] vomiting:  No [x]/Yes []               Abdominal pain:  No [x]/Yes []                     Intestinal bleeding: No [x]/Yes []  Musculoskeletal:             Leg pain:   No [x]/Yes []      Back pain:   No [x]/Yes []                    Weakness:   No [x]/Yes []  Neurologic:             Numbness:   No [x]/Yes []      Paralysis:   No [x]/Yes []                       Headaches:   No [x]/Yes []  Hematologic, lymphatic:   Anemia:   No [x]/Yes []              Bleeding or bruising:  No [x]/Yes []              Fevers or chills: No [x]/Yes []  Endocrine:             Temp intolerance:   No [x]/Yes []                       Polydipsia, polyuria:  No [x]/Yes []  Skin:              Rash:    No [x]/Yes []      Ulcers:   No [x]/Yes []              Abnorm pigment: No [x]/Yes []  :              Frequency/urgency:  No [x]/Yes []      Hematuria:    No [x]/Yes []                      Incontinence:    No [x]/Yes []    PHYSICAL EXAM:  There were no vitals filed for this visit. General Appearance: alert and oriented to person, place and time, in no acute distress, well developed and well- nourished  Neurologic: no cranial nerve deficit, speech normal  Head: normocephalic and atraumatic  Eyes: extraocular eye movements intact, conjunctivae normal  ENT: external ear and ear canal normal bilaterally, nose without deformity  Pulmonary/Chest: normal air movement, no respiratory distress  Cardiovascular: normal rate, regular rhythm  Abdomen: non-distended, no masses  Musculoskeletal: no joint deformity or tenderness  Extremities: no leg edema bilaterally  Skin: warm and dry, no rash or erythema    PULSE EXAM      Right      Left   Brachial     Radial 3 3   Femoral     Popliteal     Dorsalis Pedis 3 3   Posterior Tibial 3 3   (3=normal, 2=diminished, 1=barely palpable, 4=widened)    RADIOLOGY: Abdominal ultrasound:  Max aortic diameter = 5.5 cm. No evidence for endovascular leak.       Problem List Items Addressed This Visit     None      Visit Diagnoses     S/P AAA repair using bifurcation graft    -  Primary    Relevant Orders    US ABDOMINAL AORTA LIMITED          I reviewed with the patient that the circulation to his feet remains excellent and that the ultrasound from [facility] shows no evidence for endovascular leak. From my standpoint he can continue with all his normal activities. I will plan to see him again in 1 year with a repeat ultrasound. I asked him to call sooner with any changes or new problems. Return in about 1 year (around 2/9/2022) for testing.

## 2021-02-15 ENCOUNTER — HOSPITAL ENCOUNTER (OUTPATIENT)
Dept: GENERAL RADIOLOGY | Age: 81
Discharge: HOME | End: 2021-02-17
Payer: MEDICARE

## 2021-02-15 DIAGNOSIS — R13.12 OROPHARYNGEAL DYSPHAGIA: ICD-10-CM

## 2021-02-15 PROCEDURE — 2500000003 HC RX 250 WO HCPCS: Performed by: RADIOLOGY

## 2021-02-15 PROCEDURE — 92611 MOTION FLUOROSCOPY/SWALLOW: CPT

## 2021-02-15 PROCEDURE — 92526 ORAL FUNCTION THERAPY: CPT

## 2021-02-15 PROCEDURE — 74230 X-RAY XM SWLNG FUNCJ C+: CPT

## 2021-02-15 RX ADMIN — BARIUM SULFATE 70 G: 0.81 POWDER, FOR SUSPENSION ORAL at 11:59

## 2021-02-15 RX ADMIN — BARIUM SULFATE 10 ML: 400 PASTE ORAL at 11:58

## 2021-02-15 RX ADMIN — BARIUM SULFATE 120 ML: 400 SUSPENSION ORAL at 11:59

## 2021-02-15 NOTE — PROGRESS NOTES
Speech Language Pathology  SPEECH/LANGUAGE PATHOLOGY  VIDEOFLUOROSCOPIC STUDY OF SWALLOWING (MBS)      PATIENT NAME:  Rosio Campos      :  1940      TODAY'S DATE:  2/15/2021   ROOM:  Room/bed info not found    SUMMARY OF EVALUATION     DYSPHAGIA DIAGNOSIS: minimal pharyngeal dysphagia       DIET RECOMMENDATIONS:  Regular consistency solids with  thin liquids     FEEDING RECOMMENDATIONS:     Assistance level:  No assistance needed      Compensatory strategies recommended: No strategies are recommended at this time    THERAPY RECOMMENDATIONS:      Dysphagia therapy is not recommended    An ENT consult is recommended                PROCEDURE     Consistencies Administered During the Evaluation   Liquids: thin liquid, nectar thick liquid and honey thick liquid   Solids:  pureed foods and solid foods      Method of Intake:   cup, straw, spoon  Self fed, Fed by caregiver/family      Position:   Seated, upright, Lateral plane    Current Respiratory Status   nasal canula                RESULTS     ORAL STAGE       The oral stage of swallowing was within functional limits        PHARYNGEAL STAGE           ONSET TIME     Onset time of the pharyngeal swallow was adequate       PHARYNGEAL RESIDUALS          Vallecula/Pharyngeal Wall           Reduced tongue base retraction resulting in residuals in vallecula and/or posterior pharyngeal wall for honey consistency liquid and pudding consistency liquid which mostly cleared with liquid chaser      Pyriform Sinuses      No significant residuals were noted in the pyriform sinuses    LARYNGEAL PENETRATION     Laryngeal penetration occurred in the absence of aspiration DURING the swallow for thin liquid due to  delayed laryngeal closure which cleared from the laryngeal vestibule spontaneously (transient).  Laryngeal penetration was minimal and occurred inconsistently   an absent cough/throat clear was noted                 ASPIRATION    Aspiration was not present during this evaluation         COMPENSATORY STRATEGIES       Compensatory strategies that were beneficial included Double swallow and Alternate solids and liquids      STRUCTURAL/FUNCTIONAL ANOMALIES       No structural/functional anomalies were noted      CERVICAL ESOPHAGEAL STAGE :        The cervical esophagus appeared adequate                            The Speech Language Pathologist (SLP) completed education with the patient regarding results of evaluation. Explained that Speech Pathology intervention is not warranted  at this time   Prognosis for improvements is good     This plan will be re-evaluated and revised in 1 week  if warranted. Patient stated goals: Agreed with above,   Treatment goals discussed with Patient and Family   The Patient and Family understand(s) the diagnosis, prognosis and plan of care       CPT code:  50072  dysphagia study        [x]The admitting diagnosis and active problem list, as listed below have been reviewed prior to initiation of this evaluation. ADMITTING DIAGNOSIS: Oropharyngeal dysphagia [R13.12]     ACTIVE PROBLEM LIST:   Patient Active Problem List   Diagnosis    Mixed hyperlipidemia    Essential hypertension    COPD (chronic obstructive pulmonary disease) (HCC)    Abdominal aortic aneurysm (AAA) without rupture (HCC)    Paroxysmal atrial fibrillation (HCC)    RBBB (right bundle branch block with left anterior fascicular block)    Coronary artery disease involving native coronary artery of native heart without angina pectoris    Aspergillosis, with pneumonia ([address]    TIA (transient ischemic attack)    Pseudomonas pneumonia ([address]    S/P AAA repair using bifurcation graft   Courtney Solomon. Ed. [address] [id number]

## 2021-02-17 ENCOUNTER — HOSPITAL ENCOUNTER (OUTPATIENT)
Dept: GENERAL RADIOLOGY | Age: 81
Discharge: HOME | End: 2021-02-19
Payer: MEDICARE

## 2021-02-17 DIAGNOSIS — R49.0 HOARSENESS OF VOICE: ICD-10-CM

## 2021-02-17 DIAGNOSIS — R13.12 OROPHARYNGEAL DYSPHAGIA: ICD-10-CM

## 2021-02-17 PROCEDURE — 74220 X-RAY XM ESOPHAGUS 1CNTRST: CPT

## 2021-02-22 ENCOUNTER — NURSE ONLY (OUTPATIENT)
Dept: NON INVASIVE DIAGNOSTICS | Age: 81
End: 2021-02-22
Payer: MEDICARE

## 2021-02-22 DIAGNOSIS — I48.0 PAROXYSMAL ATRIAL FIBRILLATION (HCC): ICD-10-CM

## 2021-02-22 DIAGNOSIS — Z95.818 STATUS POST PLACEMENT OF IMPLANTABLE LOOP RECORDER: Primary | ICD-10-CM

## 2021-02-22 PROCEDURE — G2066 INTER DEVC REMOTE 30D: HCPCS | Performed by: INTERNAL MEDICINE

## 2021-02-22 PROCEDURE — 93298 REM INTERROG DEV EVAL SCRMS: CPT | Performed by: INTERNAL MEDICINE

## 2021-02-24 ENCOUNTER — OFFICE VISIT (OUTPATIENT)
Dept: ENT CLINIC | Age: 81
End: 2021-02-24
Payer: MEDICARE

## 2021-02-24 VITALS
BODY MASS INDEX: 17.32 KG/M2 | WEIGHT: 135 LBS | OXYGEN SATURATION: 79 % | HEART RATE: 66 BPM | TEMPERATURE: 97.3 F | SYSTOLIC BLOOD PRESSURE: 131 MMHG | RESPIRATION RATE: 13 BRPM | HEIGHT: 74 IN | DIASTOLIC BLOOD PRESSURE: 78 MMHG

## 2021-02-24 DIAGNOSIS — R49.0 HOARSENESS OF VOICE: Primary | ICD-10-CM

## 2021-02-24 DIAGNOSIS — R13.12 OROPHARYNGEAL DYSPHAGIA: ICD-10-CM

## 2021-02-24 PROCEDURE — 99213 OFFICE O/P EST LOW 20 MIN: CPT | Performed by: OTOLARYNGOLOGY

## 2021-02-24 PROCEDURE — 1123F ACP DISCUSS/DSCN MKR DOCD: CPT | Performed by: OTOLARYNGOLOGY

## 2021-02-24 PROCEDURE — 1036F TOBACCO NON-USER: CPT | Performed by: OTOLARYNGOLOGY

## 2021-02-24 PROCEDURE — G8484 FLU IMMUNIZE NO ADMIN: HCPCS | Performed by: OTOLARYNGOLOGY

## 2021-02-24 PROCEDURE — 4040F PNEUMOC VAC/ADMIN/RCVD: CPT | Performed by: OTOLARYNGOLOGY

## 2021-02-24 PROCEDURE — G8427 DOCREV CUR MEDS BY ELIG CLIN: HCPCS | Performed by: OTOLARYNGOLOGY

## 2021-02-24 PROCEDURE — G8419 CALC BMI OUT NRM PARAM NOF/U: HCPCS | Performed by: OTOLARYNGOLOGY

## 2021-02-24 NOTE — PROGRESS NOTES
Subjective:      Patient ID:  Shane Webster is a 79 y.o. male. HPI:    Pt returns for review of esophagoscopy. There are not changes since last visit.      Past Medical History:   Diagnosis Date    AF (atrial fibrillation) ([facility]     ONE EPISODE     Arthritis     Aspergillus ([facility] 2018    Asthma     CAD (coronary artery disease)     [facility]     vocal cord, and lung    COPD (chronic obstructive [facility] disease) [facility]     Hyperlipidemia     Hypertension     Palpitations     Pneumonia due to Pseudomonas ([facility] 2019    SOB (shortness of breath)     TIA (transient ischemic attack) 2019     Past Surgical History:   Procedure Laterality Date    ABDOMINAL AORTIC ANEURYSM REPAIR, ENDOVASCULAR      APPENDECTOMY      BACK SURGERY      BRONCHOSCOPY N/A 2019    BRONCHOSCOPY WITH ANESTHESIA performed by Lacey Bahena MD at [address] [place] [address] N/A 2019    BRONCHOSCOPY BIOPSY BRONCHUS performed by Lacey Bahena MD at [facility] CATH LAB PROCEDURE      EYE SURGERY      INSERTABLE CARDIAC MONITOR  2019    LINQ LOOP RECORDER                 DR. Hung Horvath LUNG CANCER SURGERY          PICC LINE INSERTION NURSE  2020         THROAT SURGERY      for cancer in 2016     Family History   Problem Relation Age of Onset    Other Mother     Other Father      Social History     Socioeconomic History    Marital status:      Spouse name: None    Number of children: None    Years of education: None    Highest education level: None   Occupational History    None   Social Needs    Financial resource strain: None    Food insecurity     Worry: None     Inability: None    Transportation needs     Medical: None     Non-medical: None   Tobacco Use    Smoking status: Former Smoker     Packs/day: 1.00     Years: 25.00     Pack years: 25.00     Types: Cigarettes     Quit date:      Years since quittin.1    Smokeless tobacco: Never Used   Substance and Sexual Activity    Alcohol use: No    Drug use: No    Sexual activity: Never   Lifestyle    Physical activity     Days per week: None     Minutes per session: None    Stress: None   Relationships    Social connections     Talks on phone: None     Gets together: None     Attends [employer] service: None     Active member of club or organization: None     Attends meetings of clubs or organizations: None     Relationship status: None    Intimate partner violence     Fear of current or ex partner: None     Emotionally abused: None     Physically abused: None     Forced sexual activity: None   Other Topics Concern    None   Social History Narrative    None     Allergies   Allergen Reactions    Sulfa Antibiotics Rash    Vancomycin Rash and Other (See Comments)    Trimethoprim            Review of Systems            Objective:     Vitals:    02/24/21 1546   BP: 131/78   Pulse: 66   Resp: 13   Temp: 97.3 °F (36.3 °C)   SpO2: (!) 79%     Physical Exam      Swallow study  Impression   Abnormal pharyngeal phase of the swallowing mechanism with mild vallecular   retention of barium together with transient laryngeal penetration with thin   liquid barium.  No barium aspiration. Please see separate speech pathology report for full discussion of findings   and recommendations. Esophagram  Impression   1.  Symptoms of hoarseness do not appear related to GE reflux disease or   esophageal disease. 2.  History right lung cancer and with right post thoracotomy change. 3.  Hoarseness may reflect treatment related recurrent laryngeal nerve injury. Assessment:       Diagnosis Orders   1. Hoarseness of voice     2. Oropharyngeal dysphagia                Plan:      Pt is having some trouble with aspiration on the exam but he is not interested in surgery at this time.  I will refer to speech therapy to see if this helps first.   Follow up in 4 month(s)

## 2021-03-02 ENCOUNTER — HOSPITAL ENCOUNTER (OUTPATIENT)
Dept: SPEECH THERAPY | Age: 81
Setting detail: THERAPIES SERIES
Discharge: HOME | End: 2021-03-02
Payer: MEDICARE

## 2021-03-02 PROCEDURE — 92524 BEHAVRAL QUALIT ANALYS VOICE: CPT

## 2021-03-02 NOTE — PROGRESS NOTES
[address] [place] [address]  1940  Tami Read MD    BACKGROUND INFORMATION:  Chart reviewed. Pt accompanied by wife to evaluation. 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 Pt also reported lung cancer resulting in resection of right lung in 2009. Wife reported pt's voice hasn't been normal since the laryngeal cancer and that it has worsened in the last year. 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 Pt is currently on oxygen 24 hours a day. Most recent MBS completed on 2/15/2021 indicated minimal pharyngeal dysphagia- therapy was not recommended. VOICE INFORMATION:   Difficulties reported: Hoarse voice         Date of initial voice problem:  1 year ago   Vocally abusive behaviors reported: None    []  Yelling  []screaming  []  cheering  []   throat clearing   [] coughing   [] sneezing  [] laughing  [] drinking alcohol   [] drinking coffee [] drinking milk [] smoking   [] tension   [] excessive talking  [] explosive talking  [] allergies   [] colds   Vocally abusive environments exposed to:    [] Smoke filled rooms  [] dust filled rooms  [] dry air   [] noisy rooms  []  talking to people further than 5 feet away    Injuries or operations involving the face, head, neck or chest: Removal of portion of vocal fold due to laryngeal cancer in 2013. Removal of portion of right lung with paralyzed diaphragm.      ORAL PEIPHERAL EXAMINATION   Lips: WFL   Teeth: WFL   Tongue: WFL   Tension sites:  [x]none [] face [] neck [] mandible [] body    RESPIRATION   Type of breathing observed:  [] clavicular [] thoracic [x] abdominal      Breath control    Counts on 1 breath: 9    Sustains /s/: 6 sec    Sustains /z/: 7 sec    S/Z ratio: 7 sec    Sustains /ee/: 8 sec    Sustains /ah/: 9 sec    VOICE PARAMETERS   PITCH      Pitch Discrimination: []good  [x]fair  []poor    Pitch matching:  []good  [x]fair []poor    Overall pitch:  []good  [x]fair  []poor    Vocal inflection: []good  [x]fair  []poor    Pitch breaks:  []good  [x]fair  []poor     INTENSITY    Vocal intensity:  [] normal [x]too soft []too loud []abnormal bursts of loudness     RATE    Speech rate: [x]normal []fast []slow     QUALITY    Vocal quality: []normal   [x]hoarse   []aphonia   []harsh    [x]breathy   []diplophonia  []falsetto register  []vocal mccollum  []glottal attack  []vocal tremor       RESONANCE    Nasal resonance:    []hyponasal  []hypernasal   []nasal emission      SPEECH PATHOLOGY DIAGNOSIS:  Moderate dysphonia    THERAPY RECOMMENDATIONS:   []Speech Pathology intervention is not warranted at this time. [x]Speech Pathology intervention is recommended with emphasis on the followin. Patient will complete vocal function exercises (maximum sustained phonation, pitch glides, etc) to achieve appropriate and habitual phonatory function achieving 90% accuracy. 2. Patient will maintain good vocal hygiene and demonstrate good understanding of behaviors that are damaging to the voice. 3. Patient will complete breath support exercises (abdominal breathing, expiratory muscle training, etc) to improve respiratory functional with voicing achieving 90% accuracy. [x]  Evaluation results discussed with [x]patient   [x] family. [x]  Prognosis for improvements is fair  [x]  This plan will be re-evaluated and revised in 3 weeks  if warranted. []  Patient stated goals:   [x]  Treatment goals discussed with [x]  patient/  [x]  family. [x]  The [x]  patient/ [x]  family understand the diagnosis, prognosis and plan of care. EDUCATION:   Pt/wife educated on results and POC. Discussed re-visiting POC in 2-3 weeks to determine if exercises have improved vocal function. Pt/wife were in agreement. All questions answered. Elizabeth BECK CF-SLP [id number]  Speech Language Pathologist    [id number]  behavioral quality analysis of voice      [x]Fall risk assessment completed  [x]The admitting diagnosis and active problem list, as listed below have been reviewed prior to initiation of this evaluation.      ADMITTING DIAGNOSIS: Dysphonia [R49.0]  Dysphagia, oropharyngeal phase [R13.12]     ACTIVE PROBLEM LIST:   Patient Active Problem List   Diagnosis    Mixed hyperlipidemia    Essential hypertension    COPD (chronic obstructive pulmonary disease) (HCC)    Abdominal aortic aneurysm (AAA) without rupture (HCC)    Paroxysmal atrial fibrillation (HCC)    RBBB (right bundle branch block with left anterior fascicular block)    Coronary artery disease involving native coronary artery of native heart without angina pectoris    Aspergillosis, with pneumonia ([address]    TIA (transient ischemic attack)    Pseudomonas pneumonia ([address]    S/P AAA repair using bifurcation graft

## 2021-03-08 ENCOUNTER — HOSPITAL ENCOUNTER (OUTPATIENT)
Dept: SPEECH THERAPY | Age: 81
Setting detail: THERAPIES SERIES
Discharge: HOME | End: 2021-03-08
Payer: MEDICARE

## 2021-03-08 PROCEDURE — 92507 TX SP LANG VOICE COMM INDIV: CPT

## 2021-03-08 NOTE — PROGRESS NOTES
SPEECH LANGUAGE PATHOLOGY  DAILY PROGRESS NOTE      SUBJECTIVE:  Pt was seen for 45 minutes of individualized therapy targeting dysphonia. Pt was pleasant and cooperative. OBJECTIVE:  1. Discussed good vocal hygiene and vocally abusive behaviors this session. Pt stated he drinks an average amount of water per day and drinks one cup of coffee. Encouraged pt to increase water intake by 4-8 ounces each day. Pt also encouraged to maintain good posture during conversation to avoid putting unnecessary stress on vocal folds. Pt reported he had a bad day with his voice on Thursday where he exhibited aphonia throughout the day and decreased breath support. Discussed with pt the idea of vocal rest on days where his vocal quality is poor. Also discussed speaking in shorter sentences/phrases for better breath support. Pt verbalized understanding. Handout provided. 2. Introduced cervical stretching this session. Discussed with pt stretching helps to reduce tension in the muscles of the neck which can negatively impact voicing. Pt practiced the 4 different stretching exercises and demonstrated good understanding. Handout provided. 3. Vocal function exercises introduced this session. Maximum vowel sustained phonation, pitch glides, push/pull, and humming targeted. Pt practiced each exercises with good understanding. Aphonia and pitch breaks observed. While practicing pitch glides, a change in pitch was observed however aphonia occurs for higher pitches. Pt encouraged to terminate voicing if he is exhibiting vocal mccollum. Handout provided. 4. Breath support exercises introduced this session. Pt reported these may be difficulty for him with his oxygen. SLP agreed. Encouraged pt to attempt at home and to practice inhaling and exhaling while kranthi abdominal muscles or blowing bubbles in water. Pt verbalized good understanding and that he would attempt at home. Handout provided.     Home program is to practice good vocal hygiene and avoid abusive behaviors. Additionally, to practice stretching, vocal function exercises, and breathing exercises twice daily. ASSESSMENT:  Making progress towards therapy goals        PLAN:  Will continue speech pathology intervention as per established POC        Natalie M D'Amico M. A. CF-SLP [id number]  Speech Language Pathologist  3/8/2021      CPT code(s) 94450  speech/language [place]

## 2021-03-15 ENCOUNTER — HOSPITAL ENCOUNTER (OUTPATIENT)
Dept: SPEECH THERAPY | Age: 81
Setting detail: THERAPIES SERIES
Discharge: HOME | End: 2021-03-15
Payer: MEDICARE

## 2021-03-15 NOTE — PROGRESS NOTES
See [facility] [place] report. Remote monitoring reviewed over a 30 day period. End of 30 day monitoring period date of service 2.22.2021.

## 2021-03-15 NOTE — DISCHARGE SUMMARY
[address] Outpatient Speech Therapy  Phone: [phone] Fax: [phone]   [facility] SUMMARY    Date of Report: 3/15/2021    Patient Name:Waldemar Nichols  : 1940  MRN: [id number]    Diagnosis: Dysphonia [R49.0]   Referring Physician: Andres Dai DO    SUBJECTIVE   Patient attended 1 speech therapy sessions from 3/2021 to 3/2021 , with 0  cancellations and 0 refusals. Focus of current treatment sessions has been on dysphonia. OBJECTIVE / GOAL STATUS   (Status Key: GM = Goal Met, MP = Making Progress, BP = Beginning Progress, NI = Not Introduced, D/C = Discontinue Goal, NM = Not Met)  Vocal hygiene-BP  Vocal function exercises-BP  Cervical stretching-BP    ASSESSMENT / STANDARDIZED TEST RESULTS  Patient has made limited progress over the past 1 visit. RECOMMENDATIONS  Patient is discharged at this time. Pt arrived for second treatment session and stated he did not wish to continue voice therapy. He reported the exercises make him cough uncontrollably and negatively impact his already poor respiratory status. SLP complied. Encouraged pt to continue practicing 1 exercises daily for the next few weeks to see if he notices any improvement. Patient and/or caregiver aware of diagnosis? Yes  Patient and/or caregiver agree with POC? Yes      Thank you for this referral.     Brandon BECK CF-SLP [id number]  Speech Language Pathologist    3/15/2021

## 2021-03-22 ENCOUNTER — APPOINTMENT (OUTPATIENT)
Dept: SPEECH THERAPY | Age: 81
End: 2021-03-22
Payer: MEDICARE

## 2021-03-25 ENCOUNTER — NURSE ONLY (OUTPATIENT)
Dept: NON INVASIVE DIAGNOSTICS | Age: 81
End: 2021-03-25
Payer: MEDICARE

## 2021-03-25 DIAGNOSIS — I48.0 PAROXYSMAL ATRIAL FIBRILLATION (HCC): ICD-10-CM

## 2021-03-25 DIAGNOSIS — Z95.818 STATUS POST PLACEMENT OF IMPLANTABLE LOOP RECORDER: Primary | ICD-10-CM

## 2021-03-26 ENCOUNTER — HOSPITAL ENCOUNTER (OUTPATIENT)
Age: 81
Discharge: HOME | End: 2021-03-26
Payer: MEDICARE

## 2021-03-26 DIAGNOSIS — J44.9 CHRONIC OBSTRUCTIVE PULMONARY DISEASE, UNSPECIFIED COPD TYPE (HCC): ICD-10-CM

## 2021-03-26 PROCEDURE — 87206 SMEAR FLUORESCENT/ACID STAI: CPT

## 2021-03-26 PROCEDURE — 87070 CULTURE OTHR SPECIMN AEROBIC: CPT

## 2021-03-28 LAB
CULTURE, RESPIRATORY: NORMAL
SMEAR, RESPIRATORY: NORMAL

## 2021-03-30 PROCEDURE — 93298 REM INTERROG DEV EVAL SCRMS: CPT | Performed by: INTERNAL MEDICINE

## 2021-03-30 PROCEDURE — G2066 INTER DEVC REMOTE 30D: HCPCS | Performed by: INTERNAL MEDICINE

## 2021-03-30 NOTE — PROGRESS NOTES
See [facility] [place] report. Remote monitoring reviewed over a 30 day period. End of 30 day monitoring period date of service 3.25.2021. Ron Carpio

## 2021-04-01 ENCOUNTER — TELEPHONE (OUTPATIENT)
Dept: NON INVASIVE DIAGNOSTICS | Age: 81
End: 2021-04-01

## 2021-04-01 NOTE — TELEPHONE ENCOUNTER
We have received your remote transmission. Our staff will contact you if there is anything that needs to be discussed. Your next appointment is 04/26/2021 remote transmission from home. Left message with the next remote transmission date.

## 2021-05-27 ENCOUNTER — NURSE ONLY (OUTPATIENT)
Dept: NON INVASIVE DIAGNOSTICS | Age: 81
End: 2021-05-27

## 2021-05-27 DIAGNOSIS — I48.0 PAROXYSMAL ATRIAL FIBRILLATION (HCC): ICD-10-CM

## 2021-05-27 DIAGNOSIS — Z95.818 STATUS POST PLACEMENT OF IMPLANTABLE LOOP RECORDER: Primary | ICD-10-CM

## 2021-06-24 ENCOUNTER — OFFICE VISIT (OUTPATIENT)
Dept: ENT CLINIC | Age: 81
End: 2021-06-24
Payer: MEDICARE

## 2021-06-24 VITALS
SYSTOLIC BLOOD PRESSURE: 166 MMHG | DIASTOLIC BLOOD PRESSURE: 96 MMHG | WEIGHT: 144 LBS | TEMPERATURE: 97.7 F | HEART RATE: 59 BPM | BODY MASS INDEX: 18.48 KG/M2 | HEIGHT: 74 IN | OXYGEN SATURATION: 97 %

## 2021-06-24 DIAGNOSIS — R13.12 OROPHARYNGEAL DYSPHAGIA: ICD-10-CM

## 2021-06-24 DIAGNOSIS — R49.0 HOARSENESS OF VOICE: ICD-10-CM

## 2021-06-24 DIAGNOSIS — H61.23 BILATERAL IMPACTED CERUMEN: Primary | ICD-10-CM

## 2021-06-24 PROCEDURE — 1036F TOBACCO NON-USER: CPT | Performed by: OTOLARYNGOLOGY

## 2021-06-24 PROCEDURE — G8427 DOCREV CUR MEDS BY ELIG CLIN: HCPCS | Performed by: OTOLARYNGOLOGY

## 2021-06-24 PROCEDURE — 4040F PNEUMOC VAC/ADMIN/RCVD: CPT | Performed by: OTOLARYNGOLOGY

## 2021-06-24 PROCEDURE — 99213 OFFICE O/P EST LOW 20 MIN: CPT | Performed by: OTOLARYNGOLOGY

## 2021-06-24 PROCEDURE — 69210 REMOVE IMPACTED EAR WAX UNI: CPT | Performed by: OTOLARYNGOLOGY

## 2021-06-24 PROCEDURE — 1123F ACP DISCUSS/DSCN MKR DOCD: CPT | Performed by: OTOLARYNGOLOGY

## 2021-06-24 PROCEDURE — G8419 CALC BMI OUT NRM PARAM NOF/U: HCPCS | Performed by: OTOLARYNGOLOGY

## 2021-06-24 ASSESSMENT — ENCOUNTER SYMPTOMS
APNEA: 0
VOICE CHANGE: 1
GASTROINTESTINAL NEGATIVE: 1
EYE PAIN: 0
EYES NEGATIVE: 1
DIARRHEA: 0
CHEST TIGHTNESS: 0
VOMITING: 0
ABDOMINAL PAIN: 0
RESPIRATORY NEGATIVE: 1
EYE DISCHARGE: 0
SHORTNESS OF BREATH: 0
COLOR CHANGE: 0

## 2021-06-24 NOTE — PROGRESS NOTES
 Alcohol use: No    Drug use: No    Sexual activity: Never   Other Topics Concern    None   Social History Narrative    None     Social Determinants of Health     Financial Resource Strain:     Difficulty of Paying Living Expenses:    Food Insecurity:     Worried About Running Out of Food in the Last Year:     [address] in the Last Year:    Transportation Needs:     Lack of Transportation (Medical):  Lack of Transportation (Non-Medical):    Physical Activity:     Days of Exercise per Week:     Minutes of Exercise per Session:    Stress:     Feeling of Stress :    Social Connections:     Frequency of Communication with Friends and Family:     Frequency of Social Gatherings with Friends and Family:     Attends Voodoo Services:     Active Member of Clubs or Organizations:     Attends Club or Organization Meetings:     Marital Status:    Intimate Partner Violence:     Fear of Current or Ex-Partner:     Emotionally Abused:     Physically Abused:     Sexually Abused: Allergies   Allergen Reactions    Sulfa Antibiotics Rash    Vancomycin Rash and Other (See Comments)    Trimethoprim        Review of Systems   Constitutional: Negative. Negative for appetite change. HENT: Positive for voice change. Eyes: Negative. Negative for pain, discharge and visual disturbance. Respiratory: Negative. Negative for apnea, chest tightness and shortness of breath. Cardiovascular: Negative. Negative for chest pain, palpitations and leg swelling. Gastrointestinal: Negative. Negative for abdominal pain, diarrhea and vomiting. Endocrine: Negative for cold intolerance, heat intolerance and polydipsia. Genitourinary: Negative. Negative for dysuria, flank pain and hematuria. Musculoskeletal: Negative. Negative for arthralgias, gait problem and neck pain. Skin: Negative. Negative for color change, pallor and rash.    Allergic/Immunologic: Negative for environmental allergies, food allergies and immunocompromised state. Neurological: Negative. Negative for dizziness, numbness and headaches. Hematological: Negative for adenopathy. Psychiatric/Behavioral: Negative. Negative for behavioral problems and hallucinations. All other systems reviewed and are negative. Objective:     Vitals:    06/24/21 1442   BP: (!) 166/96   Pulse: 59   Temp: 97.7 °F (36.5 °C)   SpO2: 97%     Physical Exam  Vitals and nursing note reviewed. Constitutional:       Appearance: He is well-developed. HENT:      Head: Normocephalic and atraumatic. Right Ear: Hearing, tympanic membrane, ear canal and external ear normal.      Left Ear: Hearing, tympanic membrane, ear canal and external ear normal. There is impacted cerumen. Nose: Nose normal.   Eyes:      Conjunctiva/sclera: Conjunctivae normal.      Pupils: Pupils are equal, round, and reactive to light. Cardiovascular:      Rate and Rhythm: Normal rate and regular rhythm. Heart sounds: Normal heart sounds. Pulmonary:      Effort: Pulmonary effort is normal.      Breath sounds: Normal breath sounds. Abdominal:      General: Bowel sounds are normal.      Palpations: Abdomen is soft. Musculoskeletal:      Cervical back: Normal range of motion and neck supple. Skin:     General: Skin is warm and dry. Neurological:      Mental Status: He is alert and oriented to person, place, and time. Cerumen removal      Auditory canal(s) left ear completely obstructed with cerumen. A microscope was used due to deep impaction of the cerumen. Cerumen was gently removed using soft plastic curette, suction. Tympanic membranes are intact following the procedure. Auditory canals appear normal.              Assessment:       Diagnosis Orders   1. Hoarseness of voice     2. Oropharyngeal dysphagia                Plan:      Pt is doing well and would like to recheck the vocal cord yearly so Follow up from Jn visit.    Follow up in 7 month(s) scope at that visit for cancer check.

## 2021-06-28 ENCOUNTER — NURSE ONLY (OUTPATIENT)
Dept: NON INVASIVE DIAGNOSTICS | Age: 81
End: 2021-06-28
Payer: MEDICARE

## 2021-06-28 DIAGNOSIS — Z95.818 STATUS POST PLACEMENT OF IMPLANTABLE LOOP RECORDER: Primary | ICD-10-CM

## 2021-06-28 DIAGNOSIS — I48.0 PAROXYSMAL ATRIAL FIBRILLATION (HCC): ICD-10-CM

## 2021-07-02 NOTE — PROGRESS NOTES
See [facility] [place] report. Remote monitoring reviewed over a 30 day period. End of 30 day monitoring period date of service 5/27/21.     Enrique Cortez RN, BSN  [facility]

## 2021-07-07 ENCOUNTER — TELEPHONE (OUTPATIENT)
Dept: NON INVASIVE DIAGNOSTICS | Age: 81
End: 2021-07-07

## 2021-07-07 NOTE — TELEPHONE ENCOUNTER
----- Message from XAVI Traylor NP sent at 7/6/2021 10:43 AM EDT -----  Spoke to patient again. He would like to continue monthly monitoring for recurrence. We discussed coumadin and Eliquis. Please check out-of-pocket Eliquis that would be his preference if prolonged and recurrent episodes. Would like OV with CK and will draws labs as well. Thanks.  ----- Message -----  From: Shayla Devi MD  Sent: 7/6/2021  10:07 AM EDT  To: XAVI Traylor NP    Thanks.  ----- Message -----  From: XAVI Traylor NP  Sent: 7/6/2021   8:43 AM EDT  To: Shayla Devi MD    I called and spoke to him. He has been having respiratory issues and on constant O2 therapy. He has asked to send another transmission for review. I attempted to explain PAF and stroke-risk and offered an OV. He'll send transmission and let me know. Thanks.  ----- Message -----  From: Shayla Devi MD  Sent: 7/2/2021   4:18 PM EDT  To: XAVI Traylor NP, #    Could not exclude AF. Please check CBC, CMP, TSH, FT4, PT/INR and PTT. Ask Ignacio Ledbetter to call and discuss regarding [address] therapy. Thanks.

## 2021-07-07 NOTE — TELEPHONE ENCOUNTER
I spoke to Florencia Dietz and scheduled an appt w/ CK for 8/30/21. He understands that if more episodes of AF occur then [facility] will be started.

## 2021-08-04 PROCEDURE — G2066 INTER DEVC REMOTE 30D: HCPCS | Performed by: INTERNAL MEDICINE

## 2021-08-04 PROCEDURE — 93298 REM INTERROG DEV EVAL SCRMS: CPT | Performed by: INTERNAL MEDICINE

## 2021-08-27 NOTE — PROGRESS NOTES
[address] [facility],2Nd Floor and [address] [facility] Electrophysiology  Outpatient Progress Note  PATIENT: Dang Baron RECORD NUMBER: [id number]  DATE OF SERVICE:  8/30/2021  ELECTROPHYSIOLOGIST: Tamera Vila MD  REFERRING PHYSICIAN: No ref. provider found and Faheem Tirado MD  CHIEF COMPLAINT: TIA with remote history of atrial fibrillation status post ILR in situ    Subjective: The patient is seen in cardiac electrophysiology clinic for follow up. He underwent ILR implantation on 8/14/2020. His device is followed remotely. An ILR interrogation on 7/2/21 showed questionable PAF. The patient was called and opted to continue monitoring requesting office follow-up. Today he is feeling well from an EP perspective. The patient denies any chest pain, dyspnea, palpitations, dizziness, syncope, orthopnea or paroxysmal nocturnal dyspnea. The ILR site is stable with no evidence of erosion or infection. The patient continues to be followed remotely. His device interrogation today shows no new events since his last transmission. We discuss initiation of [address] and he wishes to defer [address] at this time. Upon review of his medication list, his Lopressor was stopped for unclear reasons and the patient is unsure why also. Discussed regarding restart Beta-blocker therapy. Patient Active Problem List    Diagnosis Date Noted    Constipation 08/30/2021    S/P AAA repair using bifurcation graft 02/09/2021    Pseudomonas pneumonia ([facility] 01/07/2020    TIA (transient ischemic attack) 08/12/2019    Aspergillosis, with pneumonia ([facility] 04/02/2019    Coronary artery disease involving native coronary artery of native heart without angina pectoris 09/28/2018     Overview Note:     A.  PCI 2011 - LAD ([place])      RBBB (right bundle branch block with left anterior fascicular block) 09/13/2018    Mixed hyperlipidemia 09/07/2018    Essential hypertension 09/07/2018    COPD (chronic obstructive pulmonary disease) ([facility] 2018    Abdominal aortic aneurysm (AAA) without rupture ([facility] 2018     Overview Note:     EVAR 2018  (OSH)      Paroxysmal atrial fibrillation ([facility] 2018     Overview Note:     Single episode 5 years ago while hospitalized with pneumonia         Past Medical History:   Diagnosis Date    AF (atrial fibrillation) ([facility]     ONE EPISODE     Arthritis     Aspergillus ([facility] 2018    Asthma     CAD (coronary artery disease)     Cancer ([facility]     vocal cord, and lung    COPD (chronic obstructive pulmonary disease) ([facility]     Hyperlipidemia     Hypertension     Palpitations     Pneumonia due to Pseudomonas ([facility] 2019    SOB (shortness of breath)     TIA (transient ischemic attack) 2019       Family History   Problem Relation Age of Onset    Other Mother     Other Father        Social History     Tobacco Use    Smoking status: Former Smoker     Packs/day: 1.00     Years: 25.00     Pack years: 25.00     Types: Cigarettes     Quit date:      Years since quittin.6    Smokeless tobacco: Never Used   Substance Use Topics    Alcohol use: No       Current Outpatient Medications   Medication Sig Dispense Refill    predniSONE (DELTASONE) 5 MG tablet Take 5 mg by mouth daily      pantoprazole (PROTONIX) 40 MG tablet Take 40 mg by mouth daily      albuterol sulfate HFA (PROAIR HFA) 108 (90 Base) MCG/ACT inhaler Inhale 2 puffs into the lungs 4 times daily as needed      Revefenacin (YUPELRI) 175 MCG/3ML SOLN Take 1 vial by nebulization Daily with lunch 30 vial 5    budesonide (PULMICORT) 0.5 MG/2ML nebulizer suspension Take 2 mLs by nebulization 2 times daily 60 ampule 5    Arformoterol Tartrate (BROVANA) 15 MCG/2ML NEBU Take 1 ampule by nebulization 2 times daily Indications: Chronic Obstructive Lung Disease 120 mL 5    azithromycin (ZITHROMAX) 250 MG tablet Take 1 tablet by mouth See Admin Instructions 1 packet 5    albuterol sulfate HFA (PROAIR HFA) 108 (90 Base) MCG/ACT inhaler Inhale 2 puffs into the lungs every 6 hours as needed for Wheezing 3 Inhaler 2    meloxicam (MOBIC) 15 MG tablet Take 15 mg by mouth daily      ipratropium-albuterol (DUONEB) 0.5-2.5 (3) MG/3ML SOLN nebulizer solution inhale one vial via nebulizer twice daily as needed      atorvastatin (LIPITOR) 40 MG tablet Take by mouth      methylPREDNISolone (MEDROL DOSEPACK) 4 MG tablet NOT DUE UNTIL THE FIRST OF THE MONTH  11    aspirin 81 MG tablet Take 2 tablets by mouth daily 60 tablet 3    OXYGEN Inhale 4 L into the lungs continuous 2 lpm      tamsulosin (FLOMAX) 0.4 MG capsule Take 0.4 mg by mouth daily      alendronate (FOSAMAX) 70 MG tablet TAKE ONE TABLET BY MOUTH ONCE A WEEK  1    metoprolol tartrate (LOPRESSOR) 25 MG tablet Take 25 mg by mouth daily (Patient not taking: Reported on 8/30/2021)       No current facility-administered medications for this visit. Allergies   Allergen Reactions    Sulfa Antibiotics Rash    Vancomycin Rash and Other (See Comments)    Trimethoprim      ROS:   Constitutional: Negative for fever. Positive for activity change and negative for appetite change. HENT: Negative for epistaxis. Eyes: Negative for diploplia, blurred vision. Respiratory: Negative for cough and chest tightness Positive for shortness of breath and negative for wheezing. Cardiovascular: pertinent positives in HPI  Gastrointestinal: Negative for abdominal pain and blood in stool. All other review of systems are negative     PHYSICAL EXAM:   Vitals:    08/30/21 1420   BP: 130/86   Site: Right Upper Arm   Position: Sitting   Cuff Size: Medium Adult   Pulse: 70   Resp: 16   SpO2: 95%   Weight: 152 lb 9.6 oz (69.2 kg)   Height: 6' 2\" (1.88 m)      Constitutional: Well-developed, no acute distress  Eyes: conjunctivae normal, no xanthelasma   Ears, Nose, Throat: oral mucosa moist, no cyanosis   CV: no JVD. Regular rate and rhythm. Normal S1S2 and no S3.  No murmurs, rubs, or gallops. PMI is nondisplaced  Lungs: clear to auscultation bilaterally, normal respiratory effort without used of accessory muscles  Abdomen: soft, non-tender, bowel sounds present, no masses or hepatomegaly   Musculoskeletal: no digital clubbing, positive edema of LEs  Skin: warm, no rashes   ILR site: well healed. NO erosion, infection or migration. Echocardiogram 8/12/2019:    Findings      Left Ventricle   Left ventricle size is normal.   Normal left ventricular systolic function.   Ejection fraction is visually estimated at 55-60%.     Right Ventricle   Normal right ventricular size and function.      Left Atrium   Normal sized left atrium by volume index.   Nondiagnostic bubble study (limited visualization).      Right Atrium   Normal sized right atrium.      Mitral Valve   Physiologic and/or trace mitral regurgitation.   No evidence of hemodynamically significant mitral stenosis.      Tricuspid Valve   Physiologic and/or trace tricuspid regurgitation.      Aortic Valve   No evidence of hemodynamically significant aortic regurgitation or   stenosis.       Pulmonic Valve   The pulmonic valve was not well visualized.      Pericardial Effusion   No evidence of a hemodynamically significant pericardial effusion.      Aorta   Aortic root dimension within normal limits.      Conclusions      Summary   Normal left ventricular systolic function.   Ejection fraction is visually estimated at 55-60%.   Normal right ventricular size and function.   Nondiagnostic bubble study (limited visualization).      Signature      ----------------------------------------------------------------   Electronically signed by Jono Wallace MD(Interpreting   physician) on 08/13/2019 03:06 PM   ----------------------------------------------------------------     M-Mode/2D Measurements & Calculations      LV Diastolic    LV Systolic Dimension: 2.4   AV Cusp Separation: 1.8 cmLA   Dimension: 3.5  cm                           Dimension: 3.5 cmAO Root   cm              LV Volume Diastolic: 66.5 ml Dimension: 3.7 cm   LV FS:31.4 %    LV Volume Systolic: 02.1 ml   LV PW Systolic: LV EDV/LV EDV Index: 51.4   1.7 cm          ml/27 ml/m^2LV ESV/LV ESV   Septum          Index: 19.4 ml/10ml/ m^2     RV Diastolic Dimension: 3.6   Systolic: 1.7   EF Calculated: 62.3 %        cm   cm                                                   LA volume/Index: 44.3 ml                   LVOT: 1.9 cm                 /23.68ml/m^2                                                RA Area: 10.1 cm^2     Doppler Measurements & Calculations      MV Peak E-Wave: 0.53  AV Peak Velocity:     LVOT Peak Velocity: 0.73 m/s   m/s                   1.09 m/s              LVOT Mean Velocity: 0.43 m/s   MV Peak A-Wave: 0.79  AV Peak Gradient:     LVOT Peak Gradient: 2.1   m/s                   4.72 mmHg             mmHgLVOT Mean Gradient: 0.9   MV E/A Ratio: 0.67    AV Mean Velocity:     mmHg   MV Peak Gradient: 3.1 0.79 m/s   mmHg                  AV Mean Gradient: 2.7   MV Mean Gradient: 0.9 mmHg   mmHg                  AV VTI: 25.4 cm   MV Mean Velocity:     AV Area   0.44 m/s              (Continuity):1.7 cm^2 PV Peak Velocity: 0.65 m/s   MV Deceleration Time:                       PV Peak Gradient: 1.71 mmHg   309.7 msec            LVOT VTI: 15.2 cm     PV Mean Velocity: 0.5 m/s   MV P1/2t: 80.9 msec                         PV Mean Gradient: 1.1 mmHg   MVA by PHT:2.72 cm^2   MV Area (continuity):   1.9 cm^2   MV E' Septal   Velocity: 0.06 m/s   MV E' Lateral   Velocity: 7 m/s     [email or web address] [id number]      Stress Test 9/28/2018:    Interpreting Physician: Lucas Roblero  _________________________________________________________________________________     Indication:        Evaluation of extent and severity of coronary artery disease     Clinical History: MD on 9/28/18 at 12:06 PM    Medtronic Reveal LINQ 8/30/21:  DOI :   Battery :   No AF/fernandez/ tachy or symptom episodes  Normal device function    Assessment/Plan:    1. Paroxysmal atrial fibrillation   - Documented one occurrence in 2013 when being treated for pneumonia (no EKG for review). - [id number] 6 (age, HTN, vascular disease, TIA)   - High risk for bleeding with history of recurrent hemoptysis on Plavix. - S/p ILR 8/14/2020  - (?) PAF on remote 7/2/21: patient opted to continue monitoring requesting office follow-up. - Discussed [address]. He wishes to defer [address] at this time and understands the risk of stroke while not on [address]. 2. ILR in situ  - DOI: 8/14/19  - Indication: TIA and history of one occurrence of atrial fibrillation 6 years ago. - Normal device function. 3. Bifascicular block  - Asymptomatic     4. Coronary artery disease  - S/p PCI of LAD in 11/11/11.  - On ASA and Lipitor.  - Lopressor was stopped for unclear reasons. 5. Hypertension     6. COPD     7. Abdominal aortic aneurysm   - S/p EVAR 2018    8. TIA     9. Lung cancer  - S/p RUL and RML lobectomy in July 2009 and   - S/p bronchoscopy 12/2/19    10. History of hemoptysis   - S/p embolization of bronchial artery in March 2012     Recommendations:    1. Unsure why Lopressor was stopped. Will restart Toprol XL 25mg daily. 2. Recommend Eliquis 5 mg bid given TIA history and stroke-risk. He wishes to defer and understands the risk of stroke while not on [address]. 3. Continue remote transmission every 31 day. 4. One year in-office follow-up or sooner as needed. 5. He was asked to call the office with concerns prior to follow-up. Thank you very much to allowing me to participate in the patient's care. I have spent a total of 40 minutes with the patient and the family reviewing the above stated recommendations.   And a total of >50% of that time involved face-to-face time providing counseling and/or coordination of care with the other providers, preparation for the clinic visit, reviewing records/tests, counseling/education of the patient, ordering medications/tests/procedures, coordinating care, and documenting clinical information in the EHR.      Rose Kidd MD  Cardiac Electrophysiology  [address]   [facility] [place] [facility]  2:39 PM  8/30/2021

## 2021-08-30 ENCOUNTER — OFFICE VISIT (OUTPATIENT)
Dept: NON INVASIVE DIAGNOSTICS | Age: 81
End: 2021-08-30
Payer: MEDICARE

## 2021-08-30 VITALS
SYSTOLIC BLOOD PRESSURE: 130 MMHG | HEART RATE: 70 BPM | RESPIRATION RATE: 16 BRPM | HEIGHT: 74 IN | DIASTOLIC BLOOD PRESSURE: 86 MMHG | BODY MASS INDEX: 19.58 KG/M2 | OXYGEN SATURATION: 95 % | WEIGHT: 152.6 LBS

## 2021-08-30 DIAGNOSIS — Z95.818 STATUS POST PLACEMENT OF IMPLANTABLE LOOP RECORDER: ICD-10-CM

## 2021-08-30 PROBLEM — K59.00 CONSTIPATION: Status: ACTIVE | Noted: 2021-08-30

## 2021-08-30 PROCEDURE — 1123F ACP DISCUSS/DSCN MKR DOCD: CPT | Performed by: INTERNAL MEDICINE

## 2021-08-30 PROCEDURE — G8427 DOCREV CUR MEDS BY ELIG CLIN: HCPCS | Performed by: INTERNAL MEDICINE

## 2021-08-30 PROCEDURE — G8420 CALC BMI NORM PARAMETERS: HCPCS | Performed by: INTERNAL MEDICINE

## 2021-08-30 PROCEDURE — 93291 INTERROG DEV EVAL SCRMS IP: CPT | Performed by: INTERNAL MEDICINE

## 2021-08-30 PROCEDURE — 1036F TOBACCO NON-USER: CPT | Performed by: INTERNAL MEDICINE

## 2021-08-30 PROCEDURE — 99215 OFFICE O/P EST HI 40 MIN: CPT | Performed by: INTERNAL MEDICINE

## 2021-08-30 PROCEDURE — 4040F PNEUMOC VAC/ADMIN/RCVD: CPT | Performed by: INTERNAL MEDICINE

## 2021-08-30 RX ORDER — METOPROLOL SUCCINATE 25 MG/1
25 TABLET, EXTENDED RELEASE ORAL DAILY
Qty: 90 TABLET | Refills: 1 | Status: SHIPPED
Start: 2021-08-30 | End: 2022-01-01 | Stop reason: SDUPTHER

## 2021-08-30 RX ORDER — PREDNISONE 10 MG/1
10 TABLET ORAL DAILY
COMMUNITY
Start: 2021-08-25

## 2021-08-30 RX ORDER — PANTOPRAZOLE SODIUM 40 MG/1
40 TABLET, DELAYED RELEASE ORAL DAILY
COMMUNITY

## 2021-09-14 NOTE — TELEPHONE ENCOUNTER
Patient notified of recommendations as per Dr. Maricarmen Albert. The patient verbalized understanding.

## 2021-09-14 NOTE — TELEPHONE ENCOUNTER
----- Message from Chavo Pereyra [id number] sent at 9/13/2021  8:29 AM EDT -----  Please call patient, he wanted to discuss medications. Thank you   ----- Message -----  From: Rose Kidd MD  Sent: 9/10/2021  11:13 AM EDT  To: Chavo Pereyra 1634    I would not recommend to take them together regularly. As needed Mobic is fine. Thanks.  ----- Message -----  From: Chavo Pereyra 1634  Sent: 9/10/2021   9:05 AM EDT  To: Rose Kidd MD    Patient called in stating he recently started Eliquis and wanted to make sure that him being on Mobic is ok with the Eliquis. Please advise, thank you.

## 2021-11-29 NOTE — TELEPHONE ENCOUNTER
Patient called the office and states that he is having problems with \"bleeding\" since being on Eliquis. He reports ongoing nosebleeds. He has a history of RUL and RML lobectomy in July 2009, is currently oxygen dependent and his oxygen saturation will fall to the 70's (during his nosebleeds). He goes on to state, that he has already decreased the Elqiuis to once a day (he did this on his own). He wishes to stop the Eliquis altogether and he states he is aware of the potential of stork while not on [address] [place] [address] Road. [id number] 6 (age, HTN, vascular disease, TIA)  Please advise.

## 2021-12-06 NOTE — TELEPHONE ENCOUNTER
Appointment offered 12-14 Waldemar refused to schedule with Dr. Sarika Day  for [facility] referral. States he is not interested at this time in any procedures, test or cardiac intervention. Office number given for questions , concerns and/or scheduling.

## 2022-01-01 ENCOUNTER — OFFICE VISIT (OUTPATIENT)
Dept: ENT CLINIC | Age: 82
End: 2022-01-01
Payer: MEDICARE

## 2022-01-01 ENCOUNTER — HOSPITAL ENCOUNTER (OUTPATIENT)
Dept: CARDIOLOGY | Age: 82
Discharge: HOME | End: 2022-02-14
Payer: MEDICARE

## 2022-01-01 ENCOUNTER — TELEPHONE (OUTPATIENT)
Dept: NON INVASIVE DIAGNOSTICS | Age: 82
End: 2022-01-01

## 2022-01-01 ENCOUNTER — APPOINTMENT (OUTPATIENT)
Dept: GENERAL RADIOLOGY | Age: 82
DRG: 871 | End: 2022-01-01
Payer: MEDICARE

## 2022-01-01 ENCOUNTER — TELEPHONE (OUTPATIENT)
Dept: CARDIOLOGY | Age: 82
End: 2022-01-01

## 2022-01-01 ENCOUNTER — HOSPITAL ENCOUNTER (INPATIENT)
Age: 82
LOS: 5 days | Discharge: HOSPICE | DRG: 871 | End: 2022-09-06
Attending: EMERGENCY MEDICINE | Admitting: INTERNAL MEDICINE
Payer: MEDICARE

## 2022-01-01 ENCOUNTER — TELEPHONE (OUTPATIENT)
Dept: VASCULAR SURGERY | Age: 82
End: 2022-01-01

## 2022-01-01 ENCOUNTER — HOSPITAL ENCOUNTER (OUTPATIENT)
Age: 82
Discharge: HOME | End: 2022-06-24
Payer: MEDICARE

## 2022-01-01 ENCOUNTER — APPOINTMENT (OUTPATIENT)
Dept: CT IMAGING | Age: 82
DRG: 871 | End: 2022-01-01
Payer: MEDICARE

## 2022-01-01 VITALS
DIASTOLIC BLOOD PRESSURE: 100 MMHG | WEIGHT: 155 LBS | HEART RATE: 51 BPM | SYSTOLIC BLOOD PRESSURE: 185 MMHG | BODY MASS INDEX: 19.89 KG/M2 | HEIGHT: 74 IN

## 2022-01-01 VITALS
DIASTOLIC BLOOD PRESSURE: 77 MMHG | OXYGEN SATURATION: 94 % | TEMPERATURE: 99.4 F | BODY MASS INDEX: 20.53 KG/M2 | HEART RATE: 114 BPM | WEIGHT: 160 LBS | HEIGHT: 74 IN | RESPIRATION RATE: 30 BRPM | SYSTOLIC BLOOD PRESSURE: 113 MMHG

## 2022-01-01 VITALS — HEIGHT: 74 IN | WEIGHT: 140 LBS | BODY MASS INDEX: 17.97 KG/M2 | TEMPERATURE: 97.9 F

## 2022-01-01 DIAGNOSIS — R49.0 HOARSENESS OF VOICE: Primary | ICD-10-CM

## 2022-01-01 DIAGNOSIS — I48.0 PAROXYSMAL ATRIAL FIBRILLATION (HCC): Primary | ICD-10-CM

## 2022-01-01 DIAGNOSIS — J39.2 DRY THROAT: ICD-10-CM

## 2022-01-01 DIAGNOSIS — R13.12 OROPHARYNGEAL DYSPHAGIA: ICD-10-CM

## 2022-01-01 DIAGNOSIS — J44.1 COPD EXACERBATION (HCC): ICD-10-CM

## 2022-01-01 DIAGNOSIS — R50.9 FEVER, UNSPECIFIED FEVER CAUSE: Primary | ICD-10-CM

## 2022-01-01 DIAGNOSIS — R04.2 COUGH WITH HEMOPTYSIS: ICD-10-CM

## 2022-01-01 DIAGNOSIS — H61.23 BILATERAL IMPACTED CERUMEN: Primary | ICD-10-CM

## 2022-01-01 DIAGNOSIS — H61.23 BILATERAL IMPACTED CERUMEN: ICD-10-CM

## 2022-01-01 DIAGNOSIS — I48.0 PAROXYSMAL ATRIAL FIBRILLATION (HCC): ICD-10-CM

## 2022-01-01 LAB
ALBUMIN SERPL-MCNC: 3.7 G/DL (ref 3.5–5.2)
ALP BLD-CCNC: 105 U/L (ref 40–129)
ALT SERPL-CCNC: 22 U/L (ref 0–40)
ANION GAP SERPL CALCULATED.3IONS-SCNC: 13 MMOL/L (ref 7–16)
ANION GAP SERPL CALCULATED.3IONS-SCNC: 15 MMOL/L (ref 7–16)
ANION GAP SERPL CALCULATED.3IONS-SCNC: 9 MMOL/L (ref 7–16)
ANION GAP SERPL CALCULATED.3IONS-SCNC: 9 MMOL/L (ref 7–16)
ANISOCYTOSIS: ABNORMAL
AST SERPL-CCNC: 20 U/L (ref 0–39)
B.E.: -1.1 MMOL/L (ref -3–3)
BACTERIA: NORMAL /HPF
BASOPHILS ABSOLUTE: 0.04 E9/L (ref 0–0.2)
BASOPHILS ABSOLUTE: 0.04 E9/L (ref 0–0.2)
BASOPHILS RELATIVE PERCENT: 0.2 % (ref 0–2)
BASOPHILS RELATIVE PERCENT: 0.4 % (ref 0–2)
BILIRUB SERPL-MCNC: 1.1 MG/DL (ref 0–1.2)
BILIRUBIN URINE: NEGATIVE
BLOOD CULTURE, ROUTINE: NORMAL
BLOOD, URINE: NEGATIVE
BUN BLDV-MCNC: 26 MG/DL (ref 6–23)
BUN BLDV-MCNC: 29 MG/DL (ref 6–23)
BUN BLDV-MCNC: 29 MG/DL (ref 6–23)
BUN BLDV-MCNC: 32 MG/DL (ref 6–23)
BURR CELLS: ABNORMAL
CALCIUM SERPL-MCNC: 8.3 MG/DL (ref 8.6–10.2)
CALCIUM SERPL-MCNC: 8.5 MG/DL (ref 8.6–10.2)
CALCIUM SERPL-MCNC: 8.6 MG/DL (ref 8.6–10.2)
CALCIUM SERPL-MCNC: 9.1 MG/DL (ref 8.6–10.2)
CHLORIDE BLD-SCNC: 102 MMOL/L (ref 98–107)
CHLORIDE BLD-SCNC: 104 MMOL/L (ref 98–107)
CHLORIDE BLD-SCNC: 105 MMOL/L (ref 98–107)
CHLORIDE BLD-SCNC: 105 MMOL/L (ref 98–107)
CLARITY: CLEAR
CO2: 20 MMOL/L (ref 22–29)
CO2: 22 MMOL/L (ref 22–29)
CO2: 23 MMOL/L (ref 22–29)
CO2: 24 MMOL/L (ref 22–29)
COHB: 0.9 % (ref 0–1.5)
COLOR: YELLOW
CREAT SERPL-MCNC: 1.1 MG/DL (ref 0.7–1.2)
CREAT SERPL-MCNC: 1.2 MG/DL (ref 0.7–1.2)
CRITICAL: ABNORMAL
CULTURE, BLOOD 2: NORMAL
CULTURE, RESPIRATORY: ABNORMAL
DATE ANALYZED: ABNORMAL
DATE OF COLLECTION: ABNORMAL
EKG ATRIAL RATE: 111 BPM
EKG ATRIAL RATE: 131 BPM
EKG P AXIS: 65 DEGREES
EKG P-R INTERVAL: 180 MS
EKG Q-T INTERVAL: 294 MS
EKG Q-T INTERVAL: 346 MS
EKG QRS DURATION: 132 MS
EKG QRS DURATION: 134 MS
EKG QTC CALCULATION (BAZETT): 443 MS
EKG QTC CALCULATION (BAZETT): 470 MS
EKG R AXIS: -104 DEGREES
EKG R AXIS: -98 DEGREES
EKG T AXIS: 57 DEGREES
EKG T AXIS: 59 DEGREES
EKG VENTRICULAR RATE: 111 BPM
EKG VENTRICULAR RATE: 137 BPM
EOSINOPHILS ABSOLUTE: 0.01 E9/L (ref 0.05–0.5)
EOSINOPHILS ABSOLUTE: 0.16 E9/L (ref 0.05–0.5)
EOSINOPHILS RELATIVE PERCENT: 0 % (ref 0–6)
EOSINOPHILS RELATIVE PERCENT: 1.4 % (ref 0–6)
FUNGUS (MYCOLOGY) CULTURE: ABNORMAL
FUNGUS STAIN: ABNORMAL
GFR AFRICAN AMERICAN: >60
GFR NON-AFRICAN AMERICAN: 58 ML/MIN/1.73
GFR NON-AFRICAN AMERICAN: >60 ML/MIN/1.73
GLUCOSE BLD-MCNC: 115 MG/DL (ref 74–99)
GLUCOSE BLD-MCNC: 122 MG/DL (ref 74–99)
GLUCOSE BLD-MCNC: 82 MG/DL (ref 74–99)
GLUCOSE BLD-MCNC: 85 MG/DL (ref 74–99)
GLUCOSE URINE: NEGATIVE MG/DL
HCO3: 20.7 MMOL/L (ref 22–26)
HCT VFR BLD CALC: 37.4 % (ref 37–54)
HCT VFR BLD CALC: 37.5 % (ref 37–54)
HCT VFR BLD CALC: 38.8 % (ref 37–54)
HCT VFR BLD CALC: 43.6 % (ref 37–54)
HEMOGLOBIN: 12.3 G/DL (ref 12.5–16.5)
HEMOGLOBIN: 12.3 G/DL (ref 12.5–16.5)
HEMOGLOBIN: 12.5 G/DL (ref 12.5–16.5)
HEMOGLOBIN: 14.5 G/DL (ref 12.5–16.5)
HHB: 0.3 % (ref 0–5)
IMMATURE GRANULOCYTES #: 0.06 E9/L
IMMATURE GRANULOCYTES #: 0.15 E9/L
IMMATURE GRANULOCYTES %: 0.5 % (ref 0–5)
IMMATURE GRANULOCYTES %: 0.6 % (ref 0–5)
KETONES, URINE: NEGATIVE MG/DL
LAB: ABNORMAL
LACTIC ACID, SEPSIS: 1.8 MMOL/L (ref 0.5–1.9)
LACTIC ACID, SEPSIS: 2.4 MMOL/L (ref 0.5–1.9)
LEUKOCYTE ESTERASE, URINE: NEGATIVE
LV EF: 55 %
LVEF MODALITY: NORMAL
LYMPHOCYTES ABSOLUTE: 0.63 E9/L (ref 1.5–4)
LYMPHOCYTES ABSOLUTE: 0.94 E9/L (ref 1.5–4)
LYMPHOCYTES RELATIVE PERCENT: 2.6 % (ref 20–42)
LYMPHOCYTES RELATIVE PERCENT: 8.3 % (ref 20–42)
Lab: ABNORMAL
MCH RBC QN AUTO: 29.6 PG (ref 26–35)
MCH RBC QN AUTO: 29.9 PG (ref 26–35)
MCH RBC QN AUTO: 30.2 PG (ref 26–35)
MCH RBC QN AUTO: 30.4 PG (ref 26–35)
MCHC RBC AUTO-ENTMCNC: 32.2 % (ref 32–34.5)
MCHC RBC AUTO-ENTMCNC: 32.8 % (ref 32–34.5)
MCHC RBC AUTO-ENTMCNC: 32.9 % (ref 32–34.5)
MCHC RBC AUTO-ENTMCNC: 33.3 % (ref 32–34.5)
MCV RBC AUTO: 90.1 FL (ref 80–99.9)
MCV RBC AUTO: 91.4 FL (ref 80–99.9)
MCV RBC AUTO: 91.9 FL (ref 80–99.9)
MCV RBC AUTO: 92.8 FL (ref 80–99.9)
METHB: 0.2 % (ref 0–1.5)
MODE: ABNORMAL
MONOCYTES ABSOLUTE: 1.18 E9/L (ref 0.1–0.95)
MONOCYTES ABSOLUTE: 1.34 E9/L (ref 0.1–0.95)
MONOCYTES RELATIVE PERCENT: 11.8 % (ref 2–12)
MONOCYTES RELATIVE PERCENT: 4.9 % (ref 2–12)
NEUTROPHILS ABSOLUTE: 22.01 E9/L (ref 1.8–7.3)
NEUTROPHILS ABSOLUTE: 8.81 E9/L (ref 1.8–7.3)
NEUTROPHILS RELATIVE PERCENT: 77.6 % (ref 43–80)
NEUTROPHILS RELATIVE PERCENT: 91.7 % (ref 43–80)
NITRITE, URINE: NEGATIVE
O2 CONTENT: 20.9 ML/DL
O2 SATURATION: 99.7 % (ref 92–98.5)
O2HB: 98.6 % (ref 94–97)
OPERATOR ID: 2863
ORGANISM: ABNORMAL
OVALOCYTES: ABNORMAL
PATIENT TEMP: 37 C
PCO2: 27.3 MMHG (ref 35–45)
PDW BLD-RTO: 15.4 FL (ref 11.5–15)
PDW BLD-RTO: 15.5 FL (ref 11.5–15)
PDW BLD-RTO: 15.6 FL (ref 11.5–15)
PDW BLD-RTO: 15.6 FL (ref 11.5–15)
PEEP/CPAP: 5 CMH2O
PH BLOOD GAS: 7.5 (ref 7.35–7.45)
PH UA: 6 (ref 5–9)
PLATELET # BLD: 168 E9/L (ref 130–450)
PLATELET # BLD: 170 E9/L (ref 130–450)
PLATELET # BLD: 189 E9/L (ref 130–450)
PLATELET # BLD: 219 E9/L (ref 130–450)
PMV BLD AUTO: 10.9 FL (ref 7–12)
PMV BLD AUTO: 11 FL (ref 7–12)
PMV BLD AUTO: 11.1 FL (ref 7–12)
PMV BLD AUTO: 11.1 FL (ref 7–12)
PO2: 490.2 MMHG (ref 75–100)
POIKILOCYTES: ABNORMAL
POTASSIUM REFLEX MAGNESIUM: 4.2 MMOL/L (ref 3.5–5)
POTASSIUM REFLEX MAGNESIUM: 4.6 MMOL/L (ref 3.5–5)
POTASSIUM SERPL-SCNC: 4 MMOL/L (ref 3.5–5)
POTASSIUM SERPL-SCNC: 4.5 MMOL/L (ref 3.5–5)
PRO-BNP: 1334 PG/ML (ref 0–450)
PROCALCITONIN: 0.4 NG/ML (ref 0–0.08)
PROCALCITONIN: 0.48 NG/ML (ref 0–0.08)
PROTEIN UA: NORMAL MG/DL
PS: 12 CMH20
RBC # BLD: 4.07 E12/L (ref 3.8–5.8)
RBC # BLD: 4.16 E12/L (ref 3.8–5.8)
RBC # BLD: 4.18 E12/L (ref 3.8–5.8)
RBC # BLD: 4.77 E12/L (ref 3.8–5.8)
RBC UA: NORMAL /HPF (ref 0–2)
RR MECHANICAL: 14 B/MIN
SARS-COV-2, NAAT: NOT DETECTED
SMEAR, RESPIRATORY: ABNORMAL
SMEAR, RESPIRATORY: ABNORMAL
SODIUM BLD-SCNC: 137 MMOL/L (ref 132–146)
SODIUM BLD-SCNC: 137 MMOL/L (ref 132–146)
SODIUM BLD-SCNC: 138 MMOL/L (ref 132–146)
SODIUM BLD-SCNC: 139 MMOL/L (ref 132–146)
SOURCE, BLOOD GAS: ABNORMAL
SPECIFIC GRAVITY UA: 1.02 (ref 1–1.03)
THB: 14.1 G/DL (ref 11.5–16.5)
TIME ANALYZED: 1224
TOTAL PROTEIN: 7 G/DL (ref 6.4–8.3)
TROPONIN, HIGH SENSITIVITY: 68 NG/L (ref 0–11)
TROPONIN, HIGH SENSITIVITY: 72 NG/L (ref 0–11)
URINE CULTURE, ROUTINE: NORMAL
UROBILINOGEN, URINE: 0.2 E.U./DL
WBC # BLD: 11.4 E9/L (ref 4.5–11.5)
WBC # BLD: 14.9 E9/L (ref 4.5–11.5)
WBC # BLD: 17.1 E9/L (ref 4.5–11.5)
WBC # BLD: 24 E9/L (ref 4.5–11.5)
WBC UA: NORMAL /HPF (ref 0–5)

## 2022-01-01 PROCEDURE — 6360000002 HC RX W HCPCS: Performed by: INTERNAL MEDICINE

## 2022-01-01 PROCEDURE — 87205 SMEAR GRAM STAIN: CPT

## 2022-01-01 PROCEDURE — 87206 SMEAR FLUORESCENT/ACID STAI: CPT

## 2022-01-01 PROCEDURE — 99285 EMERGENCY DEPT VISIT HI MDM: CPT

## 2022-01-01 PROCEDURE — 80048 BASIC METABOLIC PNL TOTAL CA: CPT

## 2022-01-01 PROCEDURE — 87106 FUNGI IDENTIFICATION YEAST: CPT

## 2022-01-01 PROCEDURE — 94640 AIRWAY INHALATION TREATMENT: CPT

## 2022-01-01 PROCEDURE — 36415 COLL VENOUS BLD VENIPUNCTURE: CPT

## 2022-01-01 PROCEDURE — 87077 CULTURE AEROBIC IDENTIFY: CPT

## 2022-01-01 PROCEDURE — 6370000000 HC RX 637 (ALT 250 FOR IP): Performed by: INTERNAL MEDICINE

## 2022-01-01 PROCEDURE — 85027 COMPLETE CBC AUTOMATED: CPT

## 2022-01-01 PROCEDURE — 1200000000 HC SEMI PRIVATE

## 2022-01-01 PROCEDURE — 96365 THER/PROPH/DIAG IV INF INIT: CPT

## 2022-01-01 PROCEDURE — 96375 TX/PRO/DX INJ NEW DRUG ADDON: CPT

## 2022-01-01 PROCEDURE — 2700000000 HC OXYGEN THERAPY PER DAY

## 2022-01-01 PROCEDURE — 99213 OFFICE O/P EST LOW 20 MIN: CPT | Performed by: OTOLARYNGOLOGY

## 2022-01-01 PROCEDURE — 87088 URINE BACTERIA CULTURE: CPT

## 2022-01-01 PROCEDURE — 87186 SC STD MICRODIL/AGAR DIL: CPT

## 2022-01-01 PROCEDURE — 71045 X-RAY EXAM CHEST 1 VIEW: CPT

## 2022-01-01 PROCEDURE — 6360000002 HC RX W HCPCS: Performed by: EMERGENCY MEDICINE

## 2022-01-01 PROCEDURE — 94660 CPAP INITIATION&MGMT: CPT

## 2022-01-01 PROCEDURE — 87102 FUNGUS ISOLATION CULTURE: CPT

## 2022-01-01 PROCEDURE — 2580000003 HC RX 258: Performed by: INTERNAL MEDICINE

## 2022-01-01 PROCEDURE — 2500000003 HC RX 250 WO HCPCS: Performed by: FAMILY MEDICINE

## 2022-01-01 PROCEDURE — 2500000003 HC RX 250 WO HCPCS: Performed by: INTERNAL MEDICINE

## 2022-01-01 PROCEDURE — 80053 COMPREHEN METABOLIC PANEL: CPT

## 2022-01-01 PROCEDURE — 87635 SARS-COV-2 COVID-19 AMP PRB: CPT

## 2022-01-01 PROCEDURE — 94669 MECHANICAL CHEST WALL OSCILL: CPT

## 2022-01-01 PROCEDURE — 85025 COMPLETE CBC W/AUTO DIFF WBC: CPT

## 2022-01-01 PROCEDURE — 2580000003 HC RX 258: Performed by: FAMILY MEDICINE

## 2022-01-01 PROCEDURE — 82805 BLOOD GASES W/O2 SATURATION: CPT

## 2022-01-01 PROCEDURE — 1036F TOBACCO NON-USER: CPT | Performed by: OTOLARYNGOLOGY

## 2022-01-01 PROCEDURE — 87070 CULTURE OTHR SPECIMN AEROBIC: CPT

## 2022-01-01 PROCEDURE — 1123F ACP DISCUSS/DSCN MKR DOCD: CPT | Performed by: OTOLARYNGOLOGY

## 2022-01-01 PROCEDURE — 87040 BLOOD CULTURE FOR BACTERIA: CPT

## 2022-01-01 PROCEDURE — 93005 ELECTROCARDIOGRAM TRACING: CPT | Performed by: INTERNAL MEDICINE

## 2022-01-01 PROCEDURE — 4040F PNEUMOC VAC/ADMIN/RCVD: CPT | Performed by: OTOLARYNGOLOGY

## 2022-01-01 PROCEDURE — G8420 CALC BMI NORM PARAMETERS: HCPCS | Performed by: OTOLARYNGOLOGY

## 2022-01-01 PROCEDURE — 93306 TTE W/DOPPLER COMPLETE: CPT

## 2022-01-01 PROCEDURE — G8484 FLU IMMUNIZE NO ADMIN: HCPCS | Performed by: OTOLARYNGOLOGY

## 2022-01-01 PROCEDURE — 84484 ASSAY OF TROPONIN QUANT: CPT

## 2022-01-01 PROCEDURE — 81001 URINALYSIS AUTO W/SCOPE: CPT

## 2022-01-01 PROCEDURE — 6370000000 HC RX 637 (ALT 250 FOR IP): Performed by: EMERGENCY MEDICINE

## 2022-01-01 PROCEDURE — 84145 PROCALCITONIN (PCT): CPT

## 2022-01-01 PROCEDURE — 31575 DIAGNOSTIC LARYNGOSCOPY: CPT | Performed by: OTOLARYNGOLOGY

## 2022-01-01 PROCEDURE — 93005 ELECTROCARDIOGRAM TRACING: CPT | Performed by: EMERGENCY MEDICINE

## 2022-01-01 PROCEDURE — 83605 ASSAY OF LACTIC ACID: CPT

## 2022-01-01 PROCEDURE — 2500000003 HC RX 250 WO HCPCS: Performed by: EMERGENCY MEDICINE

## 2022-01-01 PROCEDURE — G8419 CALC BMI OUT NRM PARAM NOF/U: HCPCS | Performed by: OTOLARYNGOLOGY

## 2022-01-01 PROCEDURE — G8427 DOCREV CUR MEDS BY ELIG CLIN: HCPCS | Performed by: OTOLARYNGOLOGY

## 2022-01-01 PROCEDURE — 71250 CT THORAX DX C-: CPT

## 2022-01-01 PROCEDURE — 83880 ASSAY OF NATRIURETIC PEPTIDE: CPT

## 2022-01-01 PROCEDURE — 2580000003 HC RX 258: Performed by: EMERGENCY MEDICINE

## 2022-01-01 RX ORDER — ACETAMINOPHEN 325 MG/1
650 TABLET ORAL ONCE
Status: COMPLETED | OUTPATIENT
Start: 2022-01-01 | End: 2022-01-01

## 2022-01-01 RX ORDER — POLYETHYLENE GLYCOL 3350 17 G/17G
17 POWDER, FOR SOLUTION ORAL DAILY
Status: DISCONTINUED | OUTPATIENT
Start: 2022-01-01 | End: 2022-01-01 | Stop reason: HOSPADM

## 2022-01-01 RX ORDER — METOPROLOL SUCCINATE 25 MG/1
37.5 TABLET, EXTENDED RELEASE ORAL 2 TIMES DAILY
Qty: 270 TABLET | Refills: 3 | Status: SHIPPED
Start: 2022-01-01 | End: 2022-01-01

## 2022-01-01 RX ORDER — LORAZEPAM 0.5 MG/1
0.5 TABLET ORAL EVERY 8 HOURS PRN
Status: DISCONTINUED | OUTPATIENT
Start: 2022-01-01 | End: 2022-01-01 | Stop reason: HOSPADM

## 2022-01-01 RX ORDER — METOPROLOL SUCCINATE 25 MG/1
37.5 TABLET, EXTENDED RELEASE ORAL 2 TIMES DAILY
COMMUNITY

## 2022-01-01 RX ORDER — METOPROLOL SUCCINATE 25 MG/1
37.5 TABLET, EXTENDED RELEASE ORAL 2 TIMES DAILY
Status: DISCONTINUED | OUTPATIENT
Start: 2022-01-01 | End: 2022-01-01 | Stop reason: HOSPADM

## 2022-01-01 RX ORDER — ARFORMOTEROL TARTRATE 15 UG/2ML
15 SOLUTION RESPIRATORY (INHALATION) 2 TIMES DAILY
Status: DISCONTINUED | OUTPATIENT
Start: 2022-01-01 | End: 2022-01-01 | Stop reason: HOSPADM

## 2022-01-01 RX ORDER — ASPIRIN 81 MG/1
81 TABLET ORAL NIGHTLY
COMMUNITY

## 2022-01-01 RX ORDER — ATORVASTATIN CALCIUM 40 MG/1
40 TABLET, FILM COATED ORAL NIGHTLY
Status: DISCONTINUED | OUTPATIENT
Start: 2022-01-01 | End: 2022-01-01 | Stop reason: HOSPADM

## 2022-01-01 RX ORDER — IPRATROPIUM BROMIDE AND ALBUTEROL SULFATE 2.5; .5 MG/3ML; MG/3ML
3 SOLUTION RESPIRATORY (INHALATION) ONCE
Status: COMPLETED | OUTPATIENT
Start: 2022-01-01 | End: 2022-01-01

## 2022-01-01 RX ORDER — IPRATROPIUM BROMIDE AND ALBUTEROL SULFATE 2.5; .5 MG/3ML; MG/3ML
1 SOLUTION RESPIRATORY (INHALATION) EVERY 12 HOURS
COMMUNITY

## 2022-01-01 RX ORDER — IPRATROPIUM BROMIDE AND ALBUTEROL SULFATE 2.5; .5 MG/3ML; MG/3ML
1 SOLUTION RESPIRATORY (INHALATION)
Status: DISCONTINUED | OUTPATIENT
Start: 2022-01-01 | End: 2022-01-01 | Stop reason: HOSPADM

## 2022-01-01 RX ORDER — ARFORMOTEROL TARTRATE 15 UG/2ML
1 SOLUTION RESPIRATORY (INHALATION) 2 TIMES DAILY
COMMUNITY

## 2022-01-01 RX ORDER — PANTOPRAZOLE SODIUM 40 MG/1
40 TABLET, DELAYED RELEASE ORAL DAILY
Status: DISCONTINUED | OUTPATIENT
Start: 2022-01-01 | End: 2022-01-01 | Stop reason: HOSPADM

## 2022-01-01 RX ORDER — ENOXAPARIN SODIUM 100 MG/ML
40 INJECTION SUBCUTANEOUS DAILY
Status: DISCONTINUED | OUTPATIENT
Start: 2022-01-01 | End: 2022-01-01 | Stop reason: HOSPADM

## 2022-01-01 RX ORDER — METOPROLOL SUCCINATE 25 MG/1
25 TABLET, EXTENDED RELEASE ORAL DAILY
COMMUNITY
End: 2022-01-01 | Stop reason: SDUPTHER

## 2022-01-01 RX ORDER — METOPROLOL SUCCINATE 25 MG/1
25 TABLET, EXTENDED RELEASE ORAL EVERY 12 HOURS
Qty: 180 TABLET | Refills: 3 | Status: SHIPPED
Start: 2022-01-01 | End: 2022-01-01 | Stop reason: DRUGHIGH

## 2022-01-01 RX ORDER — TAMSULOSIN HYDROCHLORIDE 0.4 MG/1
0.4 CAPSULE ORAL NIGHTLY
Status: DISCONTINUED | OUTPATIENT
Start: 2022-01-01 | End: 2022-01-01 | Stop reason: HOSPADM

## 2022-01-01 RX ORDER — ASPIRIN 81 MG/1
81 TABLET ORAL NIGHTLY
Status: DISCONTINUED | OUTPATIENT
Start: 2022-01-01 | End: 2022-01-01 | Stop reason: HOSPADM

## 2022-01-01 RX ORDER — 0.9 % SODIUM CHLORIDE 0.9 %
500 INTRAVENOUS SOLUTION INTRAVENOUS ONCE
Status: COMPLETED | OUTPATIENT
Start: 2022-01-01 | End: 2022-01-01

## 2022-01-01 RX ORDER — METOPROLOL TARTRATE 5 MG/5ML
2.5 INJECTION INTRAVENOUS EVERY 6 HOURS PRN
Status: DISCONTINUED | OUTPATIENT
Start: 2022-01-01 | End: 2022-01-01 | Stop reason: HOSPADM

## 2022-01-01 RX ORDER — MORPHINE SULFATE 2 MG/ML
2 INJECTION, SOLUTION INTRAMUSCULAR; INTRAVENOUS EVERY 4 HOURS PRN
Status: DISCONTINUED | OUTPATIENT
Start: 2022-01-01 | End: 2022-01-01 | Stop reason: HOSPADM

## 2022-01-01 RX ADMIN — IPRATROPIUM BROMIDE AND ALBUTEROL SULFATE 3 ML: 2.5; .5 SOLUTION RESPIRATORY (INHALATION) at 08:36

## 2022-01-01 RX ADMIN — ASPIRIN 81 MG: 81 TABLET, COATED ORAL at 20:36

## 2022-01-01 RX ADMIN — ENOXAPARIN SODIUM 40 MG: 100 INJECTION SUBCUTANEOUS at 08:38

## 2022-01-01 RX ADMIN — IPRATROPIUM BROMIDE AND ALBUTEROL SULFATE 3 ML: 2.5; .5 SOLUTION RESPIRATORY (INHALATION) at 08:28

## 2022-01-01 RX ADMIN — CEFTRIAXONE 1000 MG: 1 INJECTION, POWDER, FOR SOLUTION INTRAMUSCULAR; INTRAVENOUS at 13:49

## 2022-01-01 RX ADMIN — IPRATROPIUM BROMIDE AND ALBUTEROL SULFATE 3 ML: 2.5; .5 SOLUTION RESPIRATORY (INHALATION) at 16:00

## 2022-01-01 RX ADMIN — IPRATROPIUM BROMIDE AND ALBUTEROL SULFATE 3 ML: 2.5; .5 SOLUTION RESPIRATORY (INHALATION) at 19:45

## 2022-01-01 RX ADMIN — IPRATROPIUM BROMIDE AND ALBUTEROL SULFATE 3 ML: 2.5; .5 SOLUTION RESPIRATORY (INHALATION) at 15:53

## 2022-01-01 RX ADMIN — IPRATROPIUM BROMIDE AND ALBUTEROL SULFATE 3 ML: 2.5; .5 SOLUTION RESPIRATORY (INHALATION) at 15:16

## 2022-01-01 RX ADMIN — SODIUM CHLORIDE 500 ML: 9 INJECTION, SOLUTION INTRAVENOUS at 15:41

## 2022-01-01 RX ADMIN — TAMSULOSIN HYDROCHLORIDE 0.4 MG: 0.4 CAPSULE ORAL at 20:36

## 2022-01-01 RX ADMIN — METOPROLOL SUCCINATE 37.5 MG: 25 TABLET, EXTENDED RELEASE ORAL at 09:13

## 2022-01-01 RX ADMIN — METOPROLOL SUCCINATE 37.5 MG: 25 TABLET, EXTENDED RELEASE ORAL at 21:26

## 2022-01-01 RX ADMIN — METOPROLOL SUCCINATE 37.5 MG: 25 TABLET, EXTENDED RELEASE ORAL at 20:26

## 2022-01-01 RX ADMIN — TAMSULOSIN HYDROCHLORIDE 0.4 MG: 0.4 CAPSULE ORAL at 20:26

## 2022-01-01 RX ADMIN — TAMSULOSIN HYDROCHLORIDE 0.4 MG: 0.4 CAPSULE ORAL at 21:26

## 2022-01-01 RX ADMIN — ATORVASTATIN CALCIUM 40 MG: 40 TABLET, FILM COATED ORAL at 21:26

## 2022-01-01 RX ADMIN — ATORVASTATIN CALCIUM 40 MG: 40 TABLET, FILM COATED ORAL at 21:46

## 2022-01-01 RX ADMIN — ATORVASTATIN CALCIUM 40 MG: 40 TABLET, FILM COATED ORAL at 20:36

## 2022-01-01 RX ADMIN — ATORVASTATIN CALCIUM 40 MG: 40 TABLET, FILM COATED ORAL at 20:26

## 2022-01-01 RX ADMIN — IPRATROPIUM BROMIDE AND ALBUTEROL SULFATE 3 ML: 2.5; .5 SOLUTION RESPIRATORY (INHALATION) at 08:06

## 2022-01-01 RX ADMIN — PANTOPRAZOLE SODIUM 40 MG: 40 TABLET, DELAYED RELEASE ORAL at 08:37

## 2022-01-01 RX ADMIN — DOXYCYCLINE 100 MG: 100 INJECTION, POWDER, LYOPHILIZED, FOR SOLUTION INTRAVENOUS at 10:32

## 2022-01-01 RX ADMIN — METOPROLOL SUCCINATE 37.5 MG: 25 TABLET, EXTENDED RELEASE ORAL at 21:45

## 2022-01-01 RX ADMIN — CEFTRIAXONE 1000 MG: 1 INJECTION, POWDER, FOR SOLUTION INTRAMUSCULAR; INTRAVENOUS at 15:30

## 2022-01-01 RX ADMIN — PANTOPRAZOLE SODIUM 40 MG: 40 TABLET, DELAYED RELEASE ORAL at 09:13

## 2022-01-01 RX ADMIN — DOXYCYCLINE 100 MG: 100 INJECTION, POWDER, LYOPHILIZED, FOR SOLUTION INTRAVENOUS at 21:53

## 2022-01-01 RX ADMIN — ARFORMOTEROL TARTRATE 15 MCG: 15 SOLUTION RESPIRATORY (INHALATION) at 08:05

## 2022-01-01 RX ADMIN — METOPROLOL SUCCINATE 37.5 MG: 25 TABLET, EXTENDED RELEASE ORAL at 20:13

## 2022-01-01 RX ADMIN — POLYETHYLENE GLYCOL 3350 17 G: 17 POWDER, FOR SOLUTION ORAL at 15:18

## 2022-01-01 RX ADMIN — ATORVASTATIN CALCIUM 40 MG: 40 TABLET, FILM COATED ORAL at 20:13

## 2022-01-01 RX ADMIN — CEFTRIAXONE 1000 MG: 1 INJECTION, POWDER, FOR SOLUTION INTRAMUSCULAR; INTRAVENOUS at 13:51

## 2022-01-01 RX ADMIN — ARFORMOTEROL TARTRATE 15 MCG: 15 SOLUTION RESPIRATORY (INHALATION) at 19:52

## 2022-01-01 RX ADMIN — DOXYCYCLINE 100 MG: 100 INJECTION, POWDER, LYOPHILIZED, FOR SOLUTION INTRAVENOUS at 10:52

## 2022-01-01 RX ADMIN — METOPROLOL SUCCINATE 37.5 MG: 25 TABLET, EXTENDED RELEASE ORAL at 10:26

## 2022-01-01 RX ADMIN — ARFORMOTEROL TARTRATE 15 MCG: 15 SOLUTION RESPIRATORY (INHALATION) at 08:27

## 2022-01-01 RX ADMIN — ARFORMOTEROL TARTRATE 15 MCG: 15 SOLUTION RESPIRATORY (INHALATION) at 08:16

## 2022-01-01 RX ADMIN — IPRATROPIUM BROMIDE AND ALBUTEROL SULFATE 3 ML: 2.5; .5 SOLUTION RESPIRATORY (INHALATION) at 12:08

## 2022-01-01 RX ADMIN — ARFORMOTEROL TARTRATE 15 MCG: 15 SOLUTION RESPIRATORY (INHALATION) at 08:35

## 2022-01-01 RX ADMIN — ARFORMOTEROL TARTRATE 15 MCG: 15 SOLUTION RESPIRATORY (INHALATION) at 09:36

## 2022-01-01 RX ADMIN — IPRATROPIUM BROMIDE AND ALBUTEROL SULFATE 3 AMPULE: .5; 2.5 SOLUTION RESPIRATORY (INHALATION) at 12:18

## 2022-01-01 RX ADMIN — ACETAMINOPHEN 650 MG: 325 TABLET, FILM COATED ORAL at 12:39

## 2022-01-01 RX ADMIN — TAMSULOSIN HYDROCHLORIDE 0.4 MG: 0.4 CAPSULE ORAL at 20:13

## 2022-01-01 RX ADMIN — PANTOPRAZOLE SODIUM 40 MG: 40 TABLET, DELAYED RELEASE ORAL at 10:16

## 2022-01-01 RX ADMIN — IPRATROPIUM BROMIDE AND ALBUTEROL SULFATE 3 ML: 2.5; .5 SOLUTION RESPIRATORY (INHALATION) at 12:30

## 2022-01-01 RX ADMIN — POLYETHYLENE GLYCOL 3350 17 G: 17 POWDER, FOR SOLUTION ORAL at 09:23

## 2022-01-01 RX ADMIN — ARFORMOTEROL TARTRATE 15 MCG: 15 SOLUTION RESPIRATORY (INHALATION) at 19:37

## 2022-01-01 RX ADMIN — DOXYCYCLINE 100 MG: 100 INJECTION, POWDER, LYOPHILIZED, FOR SOLUTION INTRAVENOUS at 14:34

## 2022-01-01 RX ADMIN — IPRATROPIUM BROMIDE AND ALBUTEROL SULFATE 3 ML: 2.5; .5 SOLUTION RESPIRATORY (INHALATION) at 19:52

## 2022-01-01 RX ADMIN — IPRATROPIUM BROMIDE AND ALBUTEROL SULFATE 3 ML: 2.5; .5 SOLUTION RESPIRATORY (INHALATION) at 19:49

## 2022-01-01 RX ADMIN — IPRATROPIUM BROMIDE AND ALBUTEROL SULFATE 3 ML: 2.5; .5 SOLUTION RESPIRATORY (INHALATION) at 11:50

## 2022-01-01 RX ADMIN — ARFORMOTEROL TARTRATE 15 MCG: 15 SOLUTION RESPIRATORY (INHALATION) at 19:20

## 2022-01-01 RX ADMIN — POLYETHYLENE GLYCOL 3350 17 G: 17 POWDER, FOR SOLUTION ORAL at 10:26

## 2022-01-01 RX ADMIN — MORPHINE SULFATE 2 MG: 2 INJECTION, SOLUTION INTRAMUSCULAR; INTRAVENOUS at 22:02

## 2022-01-01 RX ADMIN — DOXYCYCLINE 100 MG: 100 INJECTION, POWDER, LYOPHILIZED, FOR SOLUTION INTRAVENOUS at 09:28

## 2022-01-01 RX ADMIN — PANTOPRAZOLE SODIUM 40 MG: 40 TABLET, DELAYED RELEASE ORAL at 10:26

## 2022-01-01 RX ADMIN — METOPROLOL SUCCINATE 37.5 MG: 25 TABLET, EXTENDED RELEASE ORAL at 09:23

## 2022-01-01 RX ADMIN — IPRATROPIUM BROMIDE AND ALBUTEROL SULFATE 3 ML: 2.5; .5 SOLUTION RESPIRATORY (INHALATION) at 16:01

## 2022-01-01 RX ADMIN — IPRATROPIUM BROMIDE AND ALBUTEROL SULFATE 3 ML: 2.5; .5 SOLUTION RESPIRATORY (INHALATION) at 08:16

## 2022-01-01 RX ADMIN — DOXYCYCLINE 100 MG: 100 INJECTION, POWDER, LYOPHILIZED, FOR SOLUTION INTRAVENOUS at 22:04

## 2022-01-01 RX ADMIN — IPRATROPIUM BROMIDE AND ALBUTEROL SULFATE 3 ML: 2.5; .5 SOLUTION RESPIRATORY (INHALATION) at 19:20

## 2022-01-01 RX ADMIN — IPRATROPIUM BROMIDE AND ALBUTEROL SULFATE 3 ML: 2.5; .5 SOLUTION RESPIRATORY (INHALATION) at 09:36

## 2022-01-01 RX ADMIN — DOXYCYCLINE 100 MG: 100 INJECTION, POWDER, LYOPHILIZED, FOR SOLUTION INTRAVENOUS at 00:53

## 2022-01-01 RX ADMIN — IPRATROPIUM BROMIDE AND ALBUTEROL SULFATE 3 ML: 2.5; .5 SOLUTION RESPIRATORY (INHALATION) at 12:20

## 2022-01-01 RX ADMIN — IPRATROPIUM BROMIDE AND ALBUTEROL SULFATE 3 ML: 2.5; .5 SOLUTION RESPIRATORY (INHALATION) at 19:37

## 2022-01-01 RX ADMIN — IPRATROPIUM BROMIDE AND ALBUTEROL SULFATE 3 ML: 2.5; .5 SOLUTION RESPIRATORY (INHALATION) at 12:39

## 2022-01-01 RX ADMIN — DOXYCYCLINE 100 MG: 100 INJECTION, POWDER, LYOPHILIZED, FOR SOLUTION INTRAVENOUS at 22:46

## 2022-01-01 RX ADMIN — METOPROLOL SUCCINATE 37.5 MG: 25 TABLET, EXTENDED RELEASE ORAL at 10:15

## 2022-01-01 RX ADMIN — CEFTRIAXONE 1000 MG: 1 INJECTION, POWDER, FOR SOLUTION INTRAMUSCULAR; INTRAVENOUS at 13:02

## 2022-01-01 RX ADMIN — METOPROLOL SUCCINATE 37.5 MG: 25 TABLET, EXTENDED RELEASE ORAL at 20:36

## 2022-01-01 RX ADMIN — LORAZEPAM 0.5 MG: 0.5 TABLET ORAL at 12:16

## 2022-01-01 RX ADMIN — PANTOPRAZOLE SODIUM 40 MG: 40 TABLET, DELAYED RELEASE ORAL at 09:23

## 2022-01-01 RX ADMIN — ARFORMOTEROL TARTRATE 15 MCG: 15 SOLUTION RESPIRATORY (INHALATION) at 19:47

## 2022-01-01 RX ADMIN — DOXYCYCLINE 100 MG: 100 INJECTION, POWDER, LYOPHILIZED, FOR SOLUTION INTRAVENOUS at 08:41

## 2022-01-01 RX ADMIN — CEFTRIAXONE 1000 MG: 1 INJECTION, POWDER, FOR SOLUTION INTRAMUSCULAR; INTRAVENOUS at 13:10

## 2022-01-01 RX ADMIN — ENOXAPARIN SODIUM 40 MG: 100 INJECTION SUBCUTANEOUS at 15:29

## 2022-01-01 RX ADMIN — TAMSULOSIN HYDROCHLORIDE 0.4 MG: 0.4 CAPSULE ORAL at 21:45

## 2022-01-01 RX ADMIN — ENOXAPARIN SODIUM 40 MG: 100 INJECTION SUBCUTANEOUS at 10:16

## 2022-01-01 RX ADMIN — IPRATROPIUM BROMIDE AND ALBUTEROL SULFATE 3 ML: 2.5; .5 SOLUTION RESPIRATORY (INHALATION) at 17:40

## 2022-01-01 RX ADMIN — MORPHINE SULFATE 2 MG: 2 INJECTION, SOLUTION INTRAMUSCULAR; INTRAVENOUS at 11:16

## 2022-01-01 RX ADMIN — DOXYCYCLINE 100 MG: 100 INJECTION, POWDER, LYOPHILIZED, FOR SOLUTION INTRAVENOUS at 10:35

## 2022-01-01 RX ADMIN — METOPROLOL SUCCINATE 37.5 MG: 25 TABLET, EXTENDED RELEASE ORAL at 08:37

## 2022-01-01 RX ADMIN — MORPHINE SULFATE 2 MG: 2 INJECTION, SOLUTION INTRAMUSCULAR; INTRAVENOUS at 15:59

## 2022-01-01 RX ADMIN — CEFTRIAXONE 1000 MG: 1 INJECTION, POWDER, FOR SOLUTION INTRAMUSCULAR; INTRAVENOUS at 12:48

## 2022-01-01 RX ADMIN — ARFORMOTEROL TARTRATE 15 MCG: 15 SOLUTION RESPIRATORY (INHALATION) at 19:45

## 2022-01-01 ASSESSMENT — ENCOUNTER SYMPTOMS
GASTROINTESTINAL NEGATIVE: 1
DIARRHEA: 0
VOMITING: 0
CHEST TIGHTNESS: 0
SHORTNESS OF BREATH: 0
ABDOMINAL PAIN: 0
VOMITING: 0
DIARRHEA: 0
EYE PAIN: 0
GASTROINTESTINAL NEGATIVE: 1
EYES NEGATIVE: 1
COLOR CHANGE: 0
EYE PAIN: 0
SHORTNESS OF BREATH: 0
EYE DISCHARGE: 0
VOICE CHANGE: 1
VOICE CHANGE: 1
ABDOMINAL PAIN: 0
SORE THROAT: 0
CHEST TIGHTNESS: 0
APNEA: 0
EYES NEGATIVE: 1
RESPIRATORY NEGATIVE: 1
APNEA: 0
RESPIRATORY NEGATIVE: 1
COLOR CHANGE: 0
EYE DISCHARGE: 0

## 2022-01-01 ASSESSMENT — PAIN SCALES - GENERAL
PAINLEVEL_OUTOF10: 0
PAINLEVEL_OUTOF10: 10
PAINLEVEL_OUTOF10: 0
PAINLEVEL_OUTOF10: 0

## 2022-01-01 ASSESSMENT — PAIN - FUNCTIONAL ASSESSMENT: PAIN_FUNCTIONAL_ASSESSMENT: NONE - DENIES PAIN

## 2022-01-01 ASSESSMENT — LIFESTYLE VARIABLES: HOW OFTEN DO YOU HAVE A DRINK CONTAINING ALCOHOL: NEVER

## 2022-01-06 NOTE — TELEPHONE ENCOUNTER
Called patient regarding remote transmission and [address]. Patient states he stopped [address] at the end of November. He is aware of stroke risks. Does not want to discuss Watchman. Other than his cold he feels fine.     Lexi Alberts RN, BSN  [facility]

## 2022-01-18 NOTE — TELEPHONE ENCOUNTER
Called and spoke with patient. Reviewed Dr Judy Medina recommendation. Patient agrees to increase Toprol XL to 25mg BID and to have echo done. Patient currently under the weather with a cold.        Cyril Jones

## 2022-01-18 NOTE — TELEPHONE ENCOUNTER
----- Message from Dilcia Calixto MD sent at 1/15/2022  4:17 PM EST -----  Please repeat echo and increase Toprol XL to 25 mg bid. Thanks.  ----- Message -----  From: Minh Everett RN  Sent: 1/14/2022   7:39 AM EST  To: Dilcia Calixto MD    Please review Murj report     WCT episodes detected on:  05-Jan-2022 at 04:08, duration 14:12, mean V rate 154 bpm (prev. reported)  06-Jan-2022 at 23:25, duration 10:30 (M:S), median V rate 154 bpm, sudden termination (prev. reported)  10-Jn-2022 at 03:44, duration 37:27, mean V rate 158 bpm. EKG shows regular WCT, w/ 1 capture vs fusion beat, query SVT conducted w/ aberrancy vs VT EKG does not show onset or termination    On Toprol 25mg QD. Most recent echo 2019.     Thanks

## 2022-01-25 NOTE — TELEPHONE ENCOUNTER
CALLED PT TO SCHEDULE ECHO FOR 02-14-22. REVIEWED COVID CHECKLIST WITH PATIENT.     Electronically signed by Halina Andre on 1/25/2022 at 9:26 AM

## 2022-01-27 NOTE — PROGRESS NOTES
Subjective:      Patient ID:  Boone Casanova is a 79 y.o. male. HPI:    Pt presents with a history of cerumen impaction removal.   The patients ear was last cleaned 6 month(s) ago. The patient was not using ear drops to loosen wax immediately prior to this visit. Hearing aids: no      Pt has been a sore throat for the last 3 weeks.      Past Medical History:   Diagnosis Date    AF (atrial fibrillation) ([facility]     ONE EPISODE     Arthritis     Aspergillus ([facility] 2018    Asthma     CAD (coronary artery disease)     Cancer ([facility]     vocal cord, and lung    COPD (chronic obstructive pulmonary disease) ([facility]     Hyperlipidemia     Hypertension     Palpitations     Pneumonia due to Pseudomonas ([facility] 2019    SOB (shortness of breath)     TIA (transient ischemic attack) 2019     Past Surgical History:   Procedure Laterality Date    ABDOMINAL AORTIC ANEURYSM REPAIR, ENDOVASCULAR      APPENDECTOMY      BACK SURGERY      BRONCHOSCOPY N/A 2019    BRONCHOSCOPY WITH ANESTHESIA performed by Keyona Lim MD at [address] [place] [address] N/A 2019    BRONCHOSCOPY BIOPSY BRONCHUS performed by Keyona Lim MD at [facility] CATH LAB PROCEDURE      EYE SURGERY      INSERTABLE CARDIAC MONITOR  2019    LINQ LOOP RECORDER                 DR. Lockhart [place] LUNG CANCER SURGERY          PICC LINE INSERTION NURSE  2020         THROAT SURGERY      for cancer in 2016     Family History   Problem Relation Age of Onset    Other Mother     Other Father      Social History     Socioeconomic History    Marital status:      Spouse name: None    Number of children: None    Years of education: None    Highest education level: None   Occupational History    None   Tobacco Use    Smoking status: Former Smoker     Packs/day: 1.00     Years: 25.00     Pack years: 25.00     Types: Cigarettes     Quit date:      Years since quittin.0  Smokeless tobacco: Never Used   Vaping Use    Vaping Use: Never used   Substance and Sexual Activity    Alcohol use: No    Drug use: No    Sexual activity: Never   Other Topics Concern    None   Social History Narrative    None     Social Determinants of Health     Financial Resource Strain:     Difficulty of Paying Living Expenses: Not on file   Food Insecurity:     Worried About Running Out of Food in the Last Year: Not on file    Jeannie of Food in the Last Year: Not on file   Transportation Needs:     Lack of Transportation (Medical): Not on file    Lack of Transportation (Non-Medical): Not on file   Physical Activity:     Days of Exercise per Week: Not on file    Minutes of Exercise per Session: Not on file   Stress:     Feeling of Stress : Not on file   Social Connections:     Frequency of Communication with Friends and Family: Not on file    Frequency of Social Gatherings with Friends and Family: Not on file    Attends Rastafarian Services: Not on file    Active Member of [address] [place] [address] or Organizations: Not on file    Attends Club or Organization Meetings: Not on file    Marital Status: Not on file   Intimate Partner Violence:     Fear of Current or Ex-Partner: Not on file    Emotionally Abused: Not on file    Physically Abused: Not on file    Sexually Abused: Not on file   Housing Stability:     Unable to Pay for Housing in the Last Year: Not on file    Number of [place] in the Last Year: Not on file    Unstable Housing in the Last Year: Not on file     Allergies   Allergen Reactions    Sulfa Antibiotics Rash    Vancomycin Rash and Other (See Comments)    Trimethoprim        Review of Systems   Constitutional: Negative. Negative for appetite change. HENT: Positive for voice change. Eyes: Negative. Negative for pain, discharge and visual disturbance. Respiratory: Negative. Negative for apnea, chest tightness and shortness of breath. Cardiovascular: Negative.   Negative for chest pain, palpitations and leg swelling. Gastrointestinal: Negative. Negative for abdominal pain, diarrhea and vomiting. Endocrine: Negative for cold intolerance, heat intolerance and polydipsia. Genitourinary: Negative. Negative for dysuria, flank pain and hematuria. Musculoskeletal: Negative. Negative for arthralgias, gait problem and neck pain. Skin: Negative. Negative for color change, pallor and rash. Allergic/Immunologic: Negative for environmental allergies, food allergies and immunocompromised state. Neurological: Negative. Negative for dizziness, numbness and headaches. Hematological: Negative for adenopathy. Psychiatric/Behavioral: Negative. Negative for behavioral problems and hallucinations. All other systems reviewed and are negative. Objective:     Vitals:    01/27/22 1301   Temp: 97.9 °F (36.6 °C)     Physical Exam  Vitals and nursing note reviewed. Constitutional:       Appearance: He is well-developed. HENT:      Head: Normocephalic and atraumatic. Right Ear: Hearing, tympanic membrane, ear canal and external ear normal. There is no impacted cerumen. Left Ear: Hearing, tympanic membrane, ear canal and external ear normal. There is no impacted cerumen. Nose: Nose normal.      Mouth/Throat:      Mouth: Mucous membranes are dry. Eyes:      Conjunctiva/sclera: Conjunctivae normal.      Pupils: Pupils are equal, round, and reactive to light. Cardiovascular:      Rate and Rhythm: Normal rate and regular rhythm. Heart sounds: Normal heart sounds. Pulmonary:      Effort: Pulmonary effort is normal.      Breath sounds: Normal breath sounds. Abdominal:      General: Bowel sounds are normal.      Palpations: Abdomen is soft. Musculoskeletal:      Cervical back: Normal range of motion and neck supple. Skin:     General: Skin is warm and dry. Neurological:      Mental Status: He is alert and oriented to person, place, and time. Endoscopy Procedure Note    Pre-operative Diagnosis: hoarseness and globus    Post-operative Diagnosis: same    Indications: Hoarseness, dysphagia or aspiration - not able to be clearly evaluated by indirect laryngoscopy    Anesthesia: Lidocaine 2% and Jeffrey-Synephrine 1/2%    Endoscopy Type:  nasal endoscopy, nasopharyngoscopy, laryngoscopy    Procedure Details   With the patient sitting upright in the examining chair informed consent was obtained. The bilateral side(s) of the nose were topically anesthetized with spray. After waiting an appropriate period of time for anesthesia/ vasoconstriction to become effective, the 0-degree  flexible laryngoscope was passed through the right side(s) of the nose, and the nose, nasopharynx, oropharynx, hypopharynx and larynx were examined. An identical procedure was performed on the contralateral side. Examination was performed during quiet respiration and with phonation. I was present for the entire procedure. The following findings were noted. Findings:  Mucosa:  without erythema or discharge, pale and boggy, erythematous and swollen  PTs nasal mucosa is extremely dry with hard crusting mucus in bilateral nasal cavities     Nasal septum:  normal   Turbinates:  pale, swollen   Adenoid:  Dry and crusted   Eustachian tubes:  normal   Mucous stranding:  present   Lesions:  absent   Modified Kenny's Maneuver not indicated   Larynx Supraglottis, false and true vocal cord were normal.  Vocal cord mobility was normal.  Subglottis is patent. Condition:  Stable    Complications:  None    Pictures:                                       Assessment:       Diagnosis Orders   1. Hoarseness of voice     2. Oropharyngeal dysphagia     3. Bilateral impacted cerumen     4. Dry throat                Plan:      Pt is extremely dry but is on a new O2 machine which has a higher setting according to wife. Pt may need machine adjusted.       Follow up in 6 month(s)

## 2022-02-01 NOTE — TELEPHONE ENCOUNTER
----- Message from Rosalinda Stephnes MD sent at 2/1/2022  1:24 PM EST -----  Please ask him to increase Toprol XL 37.5 mg bid. Thanks.  ----- Message -----  From: Mima Ayoub RN  Sent: 2/1/2022   1:15 PM EST  To: Rosalinda Stephens MD    ILR Alert for Tachy Detection    Implant Indication: Cryptogenic Stroke  Hx AF declines [facility] Sqord & LAAO device, hx CAD  Per medication list, pt taking Metoprolol S, ASA 81mg  Presenting Rhythm: NSR  6 Tachy episodes detected on 30-Jan-2022 between 20:44 - 21:28  Longest duration :51:42 (M:S), median V rates 154 - 162 bpm  EKGs appear c/w SVT, occasional PVC  Additional Notes:  MEDICATION TOPROL XL 25 MG Q12H AND ASA. PATIENT STOPPED Zigswitch AT THE END OF NOVEMBER. HE DOES NOT WANT TO DISCUSS WATCHMAN.

## 2022-02-01 NOTE — TELEPHONE ENCOUNTER
Left a message to contact office for Dr. Roland Ortiz recommendation following recent ILR transmission.     Alayna Clemons RN, BSN  [facility]

## 2022-02-01 NOTE — TELEPHONE ENCOUNTER
Inga Pollard returned my call. I told him Dr. Shelly Townsend would like him to increase his Toprol Xl to 37.5 mg bid (1.5 tablets twice daily) because episodes of increased heart rates on Sunday, 1/30/21. Patient verbalized understanding. The pills are scored and he has a pill cutter. We will send a new prescription to [facility].     Alayna Clemons RN, BSN  [facility]

## 2022-02-16 NOTE — TELEPHONE ENCOUNTER
----- Message from Regina Mcallister MD sent at 2/15/2022  9:15 PM EST -----  Please let him know that echo showed normal LV function.  Thanks.  ----- Message -----  From: Marva Causey Incoming Cardiology Results From [facility]  Sent: 2/15/2022   4:57 PM EST  To: Regina Mcallister MD

## 2022-07-27 NOTE — PROGRESS NOTES
Subjective:      Patient ID:  Ranjit Haskins is a 79 y.o. male. HPI:    Pt presents with a history of cerumen impaction removal.   The patients ear was last cleaned 6 month(s) ago. The patient was not using ear drops to loosen wax immediately prior to this visit.     Pt doing much better with the moisture additive to the mask    Hearing aids: no      Past Medical History:   Diagnosis Date    AF (atrial fibrillation) ([address]     ONE EPISODE     Arthritis     Aspergillus ([facility] 2018    Asthma     CAD (coronary artery disease)     Cancer (HCC)     vocal cord, and lung    COPD (chronic obstructive pulmonary disease) (HCC)     Hyperlipidemia     Hypertension     Palpitations     Pneumonia due to Pseudomonas ([facility] 2019    SOB (shortness of breath)     TIA (transient ischemic attack) 2019     Past Surgical History:   Procedure Laterality Date    ABDOMINAL AORTIC ANEURYSM REPAIR, ENDOVASCULAR      APPENDECTOMY      BACK SURGERY      BRONCHOSCOPY N/A 2019    BRONCHOSCOPY WITH ANESTHESIA performed by Aaron Smith MD at [address] [place] [address] N/A 2019    BRONCHOSCOPY BIOPSY BRONCHUS performed by Aaron Smith MD at [place] [address] CATH LAB PROCEDURE      EYE SURGERY      INSERTABLE CARDIAC MONITOR  2019    LINQ LOOP RECORDER                 DR. Sherry Gomez     LUNG CANCER SURGERY          PICC LINE INSERTION NURSE  2020         THROAT SURGERY      for cancer in 2016     Family History   Problem Relation Age of Onset    Other Mother     Other Father      Social History     Socioeconomic History    Marital status:      Spouse name: None    Number of children: None    Years of education: None    Highest education level: None   Tobacco Use    Smoking status: Former     Packs/day: 1.00     Years: 25.00     Pack years: 25.00     Types: Cigarettes     Quit date:      Years since quittin.5    Smokeless tobacco: Never   Vaping Use    Vaping Use: Never used   Substance and Sexual Activity    Alcohol use: No    Drug use: No    Sexual activity: Never     Allergies   Allergen Reactions    Sulfa Antibiotics Rash    Vancomycin Rash and Other (See Comments)    Trimethoprim        Review of Systems   Constitutional: Negative. Negative for appetite change. HENT:  Positive for voice change. Negative for congestion, ear discharge, hearing loss, sneezing and sore throat. Eyes: Negative. Negative for pain, discharge and visual disturbance. Respiratory: Negative. Negative for apnea, chest tightness and shortness of breath. Cardiovascular: Negative. Negative for chest pain, palpitations and leg swelling. Gastrointestinal: Negative. Negative for abdominal pain, diarrhea and vomiting. Endocrine: Negative for cold intolerance, heat intolerance and polydipsia. Genitourinary: Negative. Negative for dysuria, flank pain and hematuria. Musculoskeletal: Negative. Negative for arthralgias, gait problem and neck pain. Skin: Negative. Negative for color change, pallor and rash. Allergic/Immunologic: Negative for environmental allergies, food allergies and immunocompromised state. Neurological: Negative. Negative for dizziness, numbness and headaches. Hematological:  Negative for adenopathy. Psychiatric/Behavioral: Negative. Negative for behavioral problems and hallucinations. All other systems reviewed and are negative. Objective:     Vitals:    07/27/22 1411   BP: (!) 185/100   Pulse: 51     Physical Exam  Vitals and nursing note reviewed. Constitutional:       Appearance: He is well-developed. HENT:      Head: Normocephalic and atraumatic. Right Ear: Hearing, tympanic membrane, ear canal and external ear normal. There is no impacted cerumen. Left Ear: Hearing, tympanic membrane, ear canal and external ear normal. There is no impacted cerumen. Nose: Nose normal.      Mouth/Throat:      Mouth: Mucous membranes are dry. Eyes:      Conjunctiva/sclera: Conjunctivae normal.      Pupils: Pupils are equal, round, and reactive to light. Cardiovascular:      Rate and Rhythm: Normal rate and regular rhythm. Heart sounds: Normal heart sounds. Pulmonary:      Effort: Pulmonary effort is normal.      Breath sounds: Normal breath sounds. Abdominal:      General: Bowel sounds are normal.      Palpations: Abdomen is soft. Musculoskeletal:      Cervical back: Normal range of motion and neck supple. Skin:     General: Skin is warm and dry. Neurological:      Mental Status: He is alert and oriented to person, place, and time. Assessment:       Diagnosis Orders   1. Bilateral impacted cerumen                   Plan:      Cerumen impaction   Discussed H2O2 and irrigation bi-weekly for maintenance. Follow up in 6 month(s) scope for throat at that time.

## 2022-09-01 PROBLEM — N39.0 UTI (URINARY TRACT INFECTION): Status: ACTIVE | Noted: 2022-01-01

## 2022-09-01 NOTE — ED PROVIDER NOTES
HPI:  9/1/22,   Time: 12:44 PM EDT         Hermila Muir is a 79 y.o. male presenting to the ED for shortness of breath inability to get oxygen saturation above 82% decreased appetite and a fever, beginning 2 days ago. The complaint has been persistent, severe in severity, and worsened by light exertion. Patient denies vomiting or diarrhea he does complain of anorexia he has not had hemoptysis or melena. The patient states he has had a cough productive of large amount of sputum. The patient has no chest pain or abdominal pain    ROS:   Pertinent positives and negatives are stated within HPI, all other systems reviewed and are negative.  --------------------------------------------- PAST HISTORY ---------------------------------------------  Past Medical History:  has a past medical history of AF (atrial fibrillation) ([facility] Arthritis, Aspergillus ([facility] Asthma, CAD (coronary artery disease), Cancer ([facility] COPD (chronic obstructive pulmonary disease) ([facility] Hyperlipidemia, Hypertension, Palpitations, Pneumonia due to Pseudomonas ([facility] SOB (shortness of breath), and TIA (transient ischemic attack). Past Surgical History:  has a past surgical history that includes Diagnostic Cardiac Cath Lab Procedure; Appendectomy; back surgery; eye surgery; AAA repair, endovascular; Throat surgery; Lung cancer surgery; bronchoscopy (N/A, 2/22/2019); Insertable Cardiac Monitor (08/14/2019); bronchoscopy (N/A, 12/26/2019); and picc line insertion nurse (1/9/2020). Social History:  reports that he quit smoking about 31 years ago. His smoking use included cigarettes. He has a 25.00 pack-year smoking history. He has never used smokeless tobacco. He reports that he does not drink alcohol and does not use drugs. Family History: family history includes Other in his father and mother. The patients home medications have been reviewed.     Allergies: Sulfa antibiotics, Vancomycin, and Trimethoprim    -------------------------------------------------- RESULTS -------------------------------------------------  All laboratory and radiology results have been personally reviewed by myself   LABS:  Results for orders placed or performed during the hospital encounter of 09/01/22   COVID-19, Rapid    Specimen: Nasopharyngeal Swab   Result Value Ref Range    SARS-CoV-2, NAAT Not Detected Not Detected   CBC with Auto Differential   Result Value Ref Range    WBC 24.0 (H) 4.5 - 11.5 E9/L    RBC 4.77 3.80 - 5.80 E12/L    Hemoglobin 14.5 12.5 - 16.5 g/dL    Hematocrit 43.6 37.0 - 54.0 %    MCV 91.4 80.0 - 99.9 fL    MCH 30.4 26.0 - 35.0 pg    MCHC 33.3 32.0 - 34.5 %    RDW 15.6 (H) 11.5 - 15.0 fL    Platelets 789 651 - 443 E9/L    MPV 10.9 7.0 - 12.0 fL    Neutrophils % 91.7 (H) 43.0 - 80.0 %    Immature Granulocytes % 0.6 0.0 - 5.0 %    Lymphocytes % 2.6 (L) 20.0 - 42.0 %    Monocytes % 4.9 2.0 - 12.0 %    Eosinophils % 0.0 0.0 - 6.0 %    Basophils % 0.2 0.0 - 2.0 %    Neutrophils Absolute 22.01 (H) 1.80 - 7.30 E9/L    Immature Granulocytes # 0.15 E9/L    Lymphocytes Absolute 0.63 (L) 1.50 - 4.00 E9/L    Monocytes Absolute 1.18 (H) 0.10 - 0.95 E9/L    Eosinophils Absolute 0.01 (L) 0.05 - 0.50 E9/L    Basophils Absolute 0.04 0.00 - 0.20 E9/L    Anisocytosis 1+     Poikilocytes 1+     Rew Cells 1+     Ovalocytes 1+    Comprehensive Metabolic Panel w/ Reflex to MG   Result Value Ref Range    Sodium 139 132 - 146 mmol/L    Potassium reflex Magnesium 4.2 3.5 - 5.0 mmol/L    Chloride 104 98 - 107 mmol/L    CO2 22 22 - 29 mmol/L    Anion Gap 13 7 - 16 mmol/L    Glucose 115 (H) 74 - 99 mg/dL    BUN 29 (H) 6 - 23 mg/dL    Creatinine 1.2 0.7 - 1.2 mg/dL    GFR Non-African American 58 >=60 mL/min/1.73    GFR African American >60     Calcium 9.1 8.6 - 10.2 mg/dL    Total Protein 7.0 6.4 - 8.3 g/dL    Albumin 3.7 3.5 - 5.2 g/dL    Total Bilirubin 1.1 0.0 - 1.2 mg/dL    Alkaline Phosphatase 105 40 - 129 U/L    ALT 22 0 - 40 U/L    AST 20 0 - 39 U/L   Troponin   Result Value Ref Range    Troponin, High Sensitivity 68 (H) 0 - 11 ng/L   Brain Natriuretic Peptide   Result Value Ref Range    Pro-BNP 1,334 (H) 0 - 450 pg/mL   Urinalysis with Microscopic   Result Value Ref Range    Color, UA Yellow Straw/Yellow    Clarity, UA Clear Clear    Glucose, Ur Negative Negative mg/dL    Bilirubin Urine Negative Negative    Ketones, Urine Negative Negative mg/dL    Specific Gravity, UA 1.025 1.005 - 1.030    Blood, Urine Negative Negative    pH, UA 6.0 5.0 - 9.0    Protein, UA TRACE Negative mg/dL    Urobilinogen, Urine 0.2 <2.0 E.U./dL    Nitrite, Urine Negative Negative    Leukocyte Esterase, Urine Negative Negative    WBC, UA NONE 0 - 5 /HPF    RBC, UA 1-3 0 - 2 /HPF    Bacteria, UA NONE SEEN None Seen /HPF   Lactate, Sepsis   Result Value Ref Range    Lactic Acid, Sepsis 2.4 (H) 0.5 - 1.9 mmol/L   Blood Gas, Arterial   Result Value Ref Range    Date Analyzed [id number]     Time Analyzed 1224     Source: Blood Arterial     pH, Blood Gas 7.498 (H) 7.350 - 7.450    PCO2 27.3 (L) 35.0 - 45.0 mmHg    PO2 490.2 (H) 75.0 - 100.0 mmHg    HCO3 20.7 (L) 22.0 - 26.0 mmol/L    B.E. -1.1 -3.0 - 3.0 mmol/L    O2 Sat 99.7 (H) 92.0 - 98.5 %    O2Hb 98.6 (H) 94.0 - 97.0 %    COHb 0.9 0.0 - 1.5 %    MetHb 0.2 0.0 - 1.5 %    O2 Content 20.9 mL/dL    HHb 0.3 0.0 - 5.0 %    tHb (est) 14.1 11.5 - 16.5 g/dL    Mode BILEVEL 100%     Rr Mechanical 14.0 b/min    Peep/Cpap 5.0 cmH2O    PS 12 cmH20    Date Of Collection      Time Collected      Pt Temp 37.0 C     ID [id number]     Lab [id number]     Critical(s) Notified .  No Critical Values    EKG 12 Lead   Result Value Ref Range    Ventricular Rate 111 BPM    Atrial Rate 111 BPM    P-R Interval 180 ms    QRS Duration 134 ms    Q-T Interval 346 ms    QTc Calculation (Bazett) 470 ms    P Axis 65 degrees    R Axis -104 degrees    T Axis 57 degrees       RADIOLOGY:  Interpreted by Radiologist.  XR CHEST PORTABLE   Final Result Chronic, severe emphysematous changes with bolus emphysema at the right lung   apex, elevation of the right hemidiaphragm and chronic right basilar pleural   thickening versus a chronic pleural effusion. There is no significant change   in the appearance of the chest compared with the November 2019 chest x-ray. CT CHEST WO CONTRAST    (Results Pending)       ------------------------- NURSING NOTES AND VITALS REVIEWED ---------------------------   The nursing notes within the ED encounter and vital signs as below have been reviewed. BP (!) 100/59   Pulse 89   Temp 98.3 °F (36.8 °C) (Oral)   Resp 26   Ht 6' 2\" (1.88 m)   Wt 160 lb (72.6 kg)   SpO2 94%   BMI 20.54 kg/m²   Oxygen Saturation Interpretation: Abnormal      ---------------------------------------------------PHYSICAL EXAM--------------------------------------      Constitutional/General: Alert and oriented x3, well appearing, non toxic in NAD  Head: NC/AT  Eyes: PERRL, EOMI  Mouth: Oropharynx clear, handling secretions, no trismus  Neck: Supple, full ROM, no meningeal signs  Pulmonary: Bilateral minimal expiratory wheezes with rhonchorous breath sounds not in respiratory distress  Cardiovascular:  Regular tachycardic rate and rhythm, no murmurs, gallops, or rubs. 2+ distal pulses  Abdomen: Soft, non tender, non distended,   Extremities: Moves all extremities x 4.  Warm and well perfused  Skin: warm and dry without rash  Neurologic: GCS 15,  Psych: Normal Affect      ------------------------------ ED COURSE/MEDICAL DECISION MAKING----------------------  Medications   0.9 % sodium chloride bolus (500 mLs IntraVENous New Bag 9/1/22 1541)   ipratropium-albuterol (DUONEB) nebulizer solution 3 ampule (3 ampules Inhalation Given 9/1/22 1218)   acetaminophen (TYLENOL) tablet 650 mg (650 mg Oral Given 9/1/22 1239)   cefTRIAXone (ROCEPHIN) 1,000 mg in sterile water 10 mL IV syringe (1,000 mg IntraVENous Given 9/1/22 1302)   doxycycline (VIBRAMYCIN) 100 mg in dextrose 5 % 100 mL IVPB ([id number]) ( IntraVENous Stopped 9/1/22 2676)     EKG: This EKG is signed and interpreted by me. Rate: 111  Rhythm: Sinus  Interpretation: non-specific EKG and right bundle branch block  Comparison: no previous EKG available      Medical Decision Making:    Patient was placed on BiPAP. He was given breathing treatments antibiotics acetaminophen. He was given a fluid bolus. Patient improved on BiPAP. Patient was discussed with PCP and admitted to the hospital.  Patient improved with the above therapy he was able to be taken off of BiPAP and placed on the chest oxygen by cannula    Please note that the withdrawal or failure to initiate urgent interventions for this patient would likely result in a life threatening deterioration or permanent disability. Accordingly this patient received 30 minutes of critical care time, excluding separately billable procedures. Counseling: The emergency provider has spoken with the patient and spouse/SO and discussed todays results, in addition to providing specific details for the plan of care and counseling regarding the diagnosis and prognosis. Questions are answered at this time and they are agreeable with the plan.      --------------------------------- IMPRESSION AND DISPOSITION ---------------------------------    IMPRESSION  1. Fever, unspecified fever cause    2.  COPD exacerbation ([facility]        DISPOSITION  Disposition: Admit to telemetry  Patient condition is serious                  Dilan Hanson MD  09/01/22 0680

## 2022-09-01 NOTE — ED NOTES
Pt report called to [phone] spoke with marsha kearns giving pt report     Marcos Celestin RN  09/01/22 7065

## 2022-09-02 PROBLEM — J18.9 PNEUMONIA: Status: ACTIVE | Noted: 2022-01-01

## 2022-09-02 NOTE — PROGRESS NOTES
Patient given flutter valve and instructed on how to use it. Patient states he is short of breath right now and will try it again later on. Will also start vest therapy this evening.

## 2022-09-02 NOTE — CONSULTS
[facility] [address]  [address] [facility], [address] [place] [address], [address] [place] [address]    Pulmonary Consultation      Reason for Consult:  sob and cough    Requesting Physician:  Joanna Flynn MD    CHIEF COMPLAINT:  sob and cough    History Obtained From:  patient    HISTORY OF PRESENT ILLNESS:                The patient is a 79 y.o. male with significant past medical history of COPD who presents with increased sob and cough with mod sputum production for the past 3 days. Usually with problems with mod yellowish sputum production and sob, on 7 li NC, suppose to be using chest vest bid but appears to be using only 3x/week, compliant with brovana nebs bid and yupelri daily. Both breathing and cough got worse the past 3 days though sputum color didn't change. Since here, claims doing slightly better with both breathing and cough/sputum production, still on 7 li HFNC.     Past Medical History:        Diagnosis Date    AF (atrial fibrillation) ([address] 2013    ONE EPISODE     Arthritis     Aspergillus ([address] 2018    Asthma     CAD (coronary artery disease)     Cancer (HCC)     vocal cord, and lung    COPD (chronic obstructive pulmonary disease) (HCC)     Hyperlipidemia     Hypertension     Palpitations     Pneumonia due to Pseudomonas ([address] 2019    SOB (shortness of breath)     TIA (transient ischemic attack) 08/2019       Past Surgical History:        Procedure Laterality Date    ABDOMINAL AORTIC ANEURYSM REPAIR, ENDOVASCULAR      APPENDECTOMY      BACK SURGERY      BRONCHOSCOPY N/A 2/22/2019    BRONCHOSCOPY WITH ANESTHESIA performed by Errol Scruggs MD at [facility] N/A 12/26/2019    BRONCHOSCOPY BIOPSY BRONCHUS performed by Errol Scruggs MD at [address] CATH LAB PROCEDURE      EYE SURGERY      INSERTABLE CARDIAC MONITOR  08/14/2019    LINQ LOOP RECORDER                 DR. Timothy Davison     LUNG CANCER SURGERY      2009    PICC LINE INSERTION NURSE  1/9/2020         THROAT SURGERY      for cancer in 2016       Current Medications:    Current Facility-Administered Medications: doxycycline (VIBRAMYCIN) 100 mg in dextrose 5 % 100 mL IVPB ([id number]), 100 mg, IntraVENous, Q12H  Arformoterol Tartrate (BROVANA) nebulizer solution 15 mcg, 15 mcg, Nebulization, BID  [Held by provider] aspirin EC tablet 81 mg, 81 mg, Oral, Nightly  atorvastatin (LIPITOR) tablet 40 mg, 40 mg, Oral, Nightly  ipratropium-albuterol (DUONEB) nebulizer solution 3 mL, 1 vial, Inhalation, Q4H WA  metoprolol succinate (TOPROL XL) extended release tablet 37.5 mg, 37.5 mg, Oral, BID  pantoprazole (PROTONIX) tablet 40 mg, 40 mg, Oral, Daily  tamsulosin (FLOMAX) capsule 0.4 mg, 0.4 mg, Oral, Nightly  cefTRIAXone (ROCEPHIN) 1,000 mg in sterile water 10 mL IV syringe, 1,000 mg, IntraVENous, Q24H    Allergies:  Sulfa antibiotics, Vancomycin, and Trimethoprim    Social History:    TOBACCO:   reports that he quit smoking about 32 years ago. His smoking use included cigarettes. He started smoking about 65 years ago. He has a 33.00 pack-year smoking history. He has never used smokeless tobacco.    Family History:       Problem Relation Age of Onset    Other Mother     Other Father        REVIEW OF SYSTEMS:    RESPIRATORY:  sob and cough  Remainder of complete ROS is negative. PHYSICAL EXAM:      Vitals:    BP (!) 159/79   Pulse 78   Temp 99.5 °F (37.5 °C) (Oral)   Resp 23   Ht 6' 2\" (1.88 m)   Wt 160 lb (72.6 kg)   SpO2 92%   BMI 20.54 kg/m²     EYES:  Lids and lashes normal, pupils equal, round and reactive to light, extra ocular muscles intact, sclera clear, conjunctiva normal  ENT:  Normocephalic, without obvious abnormality, atraumatic, sinuses nontender on palpation, external ears without lesions, oral pharynx with moist mucus membranes, tonsils without erythema or exudates, gums normal and good dentition.   LUNGS:  bilateral ronchi worse with cough  CARDIOVASCULAR: [facility], x [phone].

## 2022-09-02 NOTE — CARE COORDINATION
Patient presented to Ed with c/o low oxygen saturation pulse ox 82%, decreased appetite ,and fever. Placed on bipap , given IV bolus, Antibiotics and acetaminophen in ED. ON 7 liters n/c with pulse ox of 92%. Pulmonary consulted. Sputum culture obtained. On IV Rocephin and Iv Vibramycin and nebulizers. Met with patient and his spouse at bedside to discuss transition of care. Patient lives in a 1 story home with his spouse. Has O2 that is from [facility] and uses 7.5 to 8l. Was independent PTA. Declines any need for HHC or SIDDHARTHA at this time. PT/OT ordered and await their recommendations. His PCP is Dr. Que Perez and he uses [facility] and [facility] in [place]. Spouse will transport home when medically cleared.  CM/SW will continue to follow

## 2022-09-02 NOTE — H&P
[facility]  History and Physical      CHIEF COMPLAINT:    Chief Complaint   Patient presents with    Respiratory Distress     Hx COPD. Unable to get O2 above 82%. Patient has been SOB for 2 days with decreased PO for a week. Patient of Richelle Khan MD presents with:  COPD (chronic obstructive pulmonary disease) ([facility] [address]    History of Present Illness:   Patient is an 79-year-old male with a past medical history of A. fib, arthritis, Aspergillus, asthma, CAD, vocal cord CA, COPD, hyperlipidemia, hypertension, pneumonia due to Pseudomonas, and TIA. Patient presented to the ED with complaints of shortness of breath and ability to get O2 saturation greater than 82%. Patient has also had a decreased appetite and fever that began approximately 3 days ago. Patient admits to having a productive cough. Patient states his baseline O2 needs at home are 6 L he is currently 8 L. Patient admits that he attempted to give himself a breathing treatment to aid in his shortness of breath however was unsuccessful. Patient is alert and oriented on examination. Patient denies any chest pain, headache, dizziness, blurred vision, and abdominal pain. Patient states he has been admitted slight temperature and in the ED is temperature was 103.8 with a heart rate of 113. ER work-up revealed elevated WBC 24, BNP 1,334, troponin 68, CT chest pneumonia. Patient was admitted to [place] [address] unit for further testing and treatment. REVIEW OF SYSTEMS:  Pertinent negatives are above in HPI. 10 point ROS otherwise negative.       Past Medical History:   Diagnosis Date    AF (atrial fibrillation) ([facility] [address] 2013    ONE EPISODE     Arthritis     Aspergillus ([facility] [address] 2018    Asthma     CAD (coronary artery disease)     Cancer (HCC)     vocal cord, and lung    COPD (chronic obstructive pulmonary disease) (HCC)     Hyperlipidemia     Hypertension     Palpitations     Pneumonia due to Pseudomonas ([facility] [address] 2019    SOB (shortness of breath)     TIA (transient ischemic attack) 08/2019         Past Surgical History:   Procedure Laterality Date    ABDOMINAL AORTIC ANEURYSM REPAIR, ENDOVASCULAR      APPENDECTOMY      BACK SURGERY      BRONCHOSCOPY N/A 2/22/2019    BRONCHOSCOPY WITH ANESTHESIA performed by Angel Luis Miramontes MD at [facility] N/A 12/26/2019    BRONCHOSCOPY BIOPSY BRONCHUS performed by Angel Luis Miramontes MD at [place] [address] CATH LAB PROCEDURE      EYE SURGERY      INSERTABLE CARDIAC MONITOR  08/14/2019    LINQ LOOP RECORDER                 DR. Edgardo Colindres     LUNG CANCER SURGERY      2009    PICC LINE INSERTION NURSE  1/9/2020         THROAT SURGERY      for cancer in 2016       Medications Prior to Admission:    Medications Prior to Admission: Arformoterol Tartrate (BROVANA) 15 MCG/2ML NEBU, Take 1 ampule by nebulization 2 times daily  aspirin 81 MG EC tablet, Take 81 mg by mouth at bedtime  ipratropium-albuterol (DUONEB) 0.5-2.5 (3) MG/3ML SOLN nebulizer solution, Take 1 vial by nebulization every 12 hours  metoprolol succinate (TOPROL XL) 25 MG extended release tablet, Take 37.5 mg by mouth in the morning and at bedtime  Revefenacin (YUPELRI) 175 MCG/3ML SOLN, Take 1 vial by nebulization Daily with lunch  albuterol sulfate HFA (PROAIR HFA) 108 (90 Base) MCG/ACT inhaler, Inhale 2 puffs into the lungs 4 times daily as needed for Shortness of Breath  predniSONE (DELTASONE) 10 MG tablet, Take 10 mg by mouth daily   pantoprazole (PROTONIX) 40 MG tablet, Take 40 mg by mouth daily  meloxicam (MOBIC) 15 MG tablet, Take 15 mg by mouth every other day  atorvastatin (LIPITOR) 40 MG tablet, Take 40 mg by mouth daily  OXYGEN, Inhale 7-8 L into the lungs continuous  tamsulosin (FLOMAX) 0.4 MG capsule, Take 0.4 mg by mouth at bedtime  alendronate (FOSAMAX) 70 MG tablet, Take 70 mg by mouth every 7 days Given Sunday    Note that the patient's home medications were reviewed and the above list is accurate to the best of my knowledge at the time of the exam.    Allergies:    Sulfa antibiotics, Vancomycin, and Trimethoprim    Social History:    reports that he quit smoking about 31 years ago. His smoking use included cigarettes. He has a 25.00 pack-year smoking history. He has never used smokeless tobacco. He reports that he does not drink alcohol and does not use drugs. Family History:   family history includes Other in his father and mother. PHYSICAL EXAM:    Vitals:  BP (!) 142/96   Pulse 99   Temp 97.2 °F (36.2 °C) (Temporal)   Resp 25   Ht 6' 2\" (1.88 m)   Wt 160 lb (72.6 kg)   SpO2 97%   BMI 20.54 kg/m²       General appearance: NAD, conversant  Eyes: Sclerae anicteric, PERRLA  HEENT: AT/NC, MMM  Neck: FROM, supple, no thyromegaly  Lymph: No cervical / supraclavicular lymphadenopathy  Lungs: Crackles in the bases, 8 L O2  CV: RRR, no MRGs, no lower extremity edema  Abdomen: Soft, non-tender; no masses or HSM, +BS  Extremities: FROM without synovitis. No clubbing or cyanosis of the hands. Skin: no rash, induration, lesions, or ulcers  Psych: Calm and cooperative. Normal judgement and insight. Normal mood and affect. Neuro: Alert and interactive, face symmetric, speech fluent. LABS:  All labs reviewed.   Of note:  CBC with Differential:    Lab Results   Component Value Date/Time    WBC 24.0 09/01/2022 12:12 PM    RBC 4.77 09/01/2022 12:12 PM    HGB 14.5 09/01/2022 12:12 PM    HCT 43.6 09/01/2022 12:12 PM     09/01/2022 12:12 PM    MCV 91.4 09/01/2022 12:12 PM    MCH 30.4 09/01/2022 12:12 PM    MCHC 33.3 09/01/2022 12:12 PM    RDW 15.6 09/01/2022 12:12 PM    LYMPHOPCT 2.6 09/01/2022 12:12 PM    MONOPCT 4.9 09/01/2022 12:12 PM    BASOPCT 0.2 09/01/2022 12:12 PM    MONOSABS 1.18 09/01/2022 12:12 PM    LYMPHSABS 0.63 09/01/2022 12:12 PM    EOSABS 0.01 09/01/2022 12:12 PM    BASOSABS 0.04 09/01/2022 12:12 PM     CMP:    Lab Results   Component Value Date/Time     09/01/2022 12:12 PM    K 4.2 09/01/2022 12:12 PM     09/01/2022 12:12 PM    CO2 22 09/01/2022 12:12 PM    BUN 29 09/01/2022 12:12 PM    CREATININE 1.2 09/01/2022 12:12 PM    GFRAA >60 09/01/2022 12:12 PM    LABGLOM 58 09/01/2022 12:12 PM    GLUCOSE 115 09/01/2022 12:12 PM    PROT 7.0 09/01/2022 12:12 PM    LABALBU 3.7 09/01/2022 12:12 PM    CALCIUM 9.1 09/01/2022 12:12 PM    BILITOT 1.1 09/01/2022 12:12 PM    ALKPHOS 105 09/01/2022 12:12 PM    AST 20 09/01/2022 12:12 PM    ALT 22 09/01/2022 12:12 PM       Imaging:  CT chest: Advanced emphysema with patchy masslike infiltrate densities in the right lung with a cavitary lesion in the right upper lobe and right lungs with positive air-fluid levels likely complicated pneumonia/lung abscess. Necrotic malignancy is less likely. Bronchitis and mucous plugging in the right lung. CXR: Chronic, severe emphysematous changes with bullous emphysema the right lung BX, elevation of the right hemidiaphragm and chronic basilar pleural thickening versus a chronic pleural effusion. EKG:  I've personally reviewed the patient's EKG:  Sinus tachycardia    Telemetry:  I've personally reviewed the patient's telemetry:  NSR    ASSESSMENT/PLAN:  Principal Problem:    COPD (chronic obstructive pulmonary disease) (HCC)  Active Problems:    Pneumonia  Resolved Problems:    * No resolved hospital problems. *    69-year-old male with a history of COPD and O2 needs at home presents to the ED with complaints of shortness of breath and low pulse ox saturation and history of aspergillus is admitted to [place] [address] unit with    COPD exacerbation due to pneumonia  -Supplement O2 demands keeping oxygen saturation greater than 92%.   Currently utilizing 8 L for saturation 96%  -Rocephin/Doxy pending panculture results  -DuoNebs /chest vest  -Swallow study questionable aspiration pneumonia  -Strep/Legionella, follow Pro-Bill, repeat a.m. lab  -Consult pulmonary-appreciate input  -Pulmonary hygiene/ISP  -Check blood cultures as patient is having shaking chills on my evaluation today    Hold anticoagulation for questionable bronc related to mucous plugging in the right lung. Medication for other comorbidities continue as appropriate dose adjustment as necessary. DVT prophylaxis -PCD's  PT OT  Discharge planning  Case discussed with attending and agreed upon plan of care. Code status: Full  Requires inpatient level of care  XAVI Canela CNP    7:15 AM  9/2/2022     Above note edited to reflect my thoughts     I personally saw, examined and provided care for the patient. Radiographs, labs and medication list were reviewed by me independently. The case was discussed in detail and plans for care were established. Review of Kelle COOPER CNP, documentation was conducted and revisions were made as appropriate directly by me. I agree with the above documented exam, problem list, and plan of care.      Nayeli Blandon MD  2:25 PM  9/2/2022

## 2022-09-03 NOTE — PLAN OF CARE
Problem: Pain  Goal: Verbalizes/displays adequate comfort level or baseline comfort level  9/3/2022 0226 by Tiffany Wilkins RN  Outcome: Progressing  9/3/2022 0226 by Tiffany Wilkins RN  Outcome: Progressing

## 2022-09-03 NOTE — PROGRESS NOTES
BMP:  Recent Labs     09/01/22  1212 09/02/22  0751 09/03/22  0423    138 137   K 4.2 4.6 4.0    105 105   CO2 22 24 23   BUN 29* 29* 26*   CREATININE 1.2 1.2 1.2    ALB:3,BILIDIR:3,BILITOT:3,ALKPHOS:3)@    PT/INR: No results for input(s): PROTIME, INR in the last 72 hours. ABG:   Recent Labs     09/01/22  1224   PH 7.498*   PO2 490.2*   PCO2 27.3*   HCO3 20.7*   BE -1.1   O2SAT 99.7*   METHB 0.2   O2HB 98.6*   COHB 0.9   O2CON 20.9   HHB 0.3   THB 14.1     FiO2 : 100 %       Radiology/Other tests reviewed: none    Assessment:     Principal Problem:    COPD (chronic obstructive pulmonary disease) (HCC)  Active Problems:    Pneumonia  Resolved Problems:    * No resolved hospital problems. *      Plan:       Cont with antibiotics, await sputum culture, (?) pseudomonas again  Cont with nebs, observe respiratory function and cough  Cont with chest vest and flutter device for expectoration  Cont with oxygen, taper as tolerated  OOB to chair, PT/OT      Time at the bedside, reviewing labs and radiographs, reviewing notes and consultations, discussing with staff and family was more than 35 minutes. Thanks for letting us see this patient in consultation. Please contact us with any questions. Office [phone] or after hours through [employer], x [phone].

## 2022-09-03 NOTE — PLAN OF CARE
Problem: Pain  Goal: Verbalizes/displays adequate comfort level or baseline comfort level  9/3/2022 1155 by Dodie Hennessy RN  Outcome: Progressing

## 2022-09-03 NOTE — PROGRESS NOTES
Internal Medicine   Progress Note    Admit Date: 9/1/2022  Hospital day:    Hospital Day: 3  SUBJECTIVE:  No new acute problems overnight. Doing OK. No chest pain or shortness of breath. No nausea or vomiting. No fever. No abdominal pain. OBJECTIVE:     /60   Pulse 68   Temp 98.6 °F (37 °C) (Oral)   Resp 18   Ht 6' 2\" (1.88 m)   Wt 160 lb (72.6 kg)   SpO2 96%   BMI 20.54 kg/m²   Patient Vitals for the past 24 hrs:   BP Temp Temp src Pulse Resp SpO2   09/03/22 0815 131/60 98.6 °F (37 °C) Oral 68 18 96 %   09/03/22 0805 -- -- -- -- -- 96 %   09/03/22 0754 (!) 149/80 98.7 °F (37.1 °C) Oral 100 -- 98 %   09/02/22 2130 (!) 121/58 98.9 °F (37.2 °C) Oral 90 20 95 %   09/02/22 1946 -- -- -- 84 28 91 %   09/02/22 1516 -- -- -- (!) 105 20 94 %     24HR INTAKE/OUTPUT:    Intake/Output Summary (Last 24 hours) at 9/3/2022 1222  Last data filed at 9/3/2022 1030  Gross per 24 hour   Intake 100 ml   Output 650 ml   Net -550 ml      GENERAL:  Alert,breathing easily, not in apparent acute distress. LUNGS:  No obvious increased work of breathing, occ rales and wheeze this AM  CARDIOVASCULAR:  S1 and S2 regular to auscultate. ABDOMEN:  Soft, non-tender. Bowel sounds present  NEUROLOGIC:  Alert, and oriented.   No gross focal deficit  EXTREMITIES: No edema  Data    Recent Labs     09/01/22  1212 09/02/22  0751 09/03/22  0423   WBC 24.0* 17.1* 11.4   HGB 14.5 12.3* 12.3*    168 170     Recent Labs     09/01/22  1212 09/02/22  0751 09/03/22  0423    138 137   K 4.2 4.6 4.0    105 105   CO2 22 24 23   BUN 29* 29* 26*   CREATININE 1.2 1.2 1.2   GLUCOSE 115* 85 82     Recent Labs     09/01/22  1212   AST 20   ALT 22   BILITOT 1.1   ALKPHOS 105       CT CHEST WO CONTRAST    Result Date: 9/1/2022  EXAMINATION: CT OF THE CHEST WITHOUT CONTRAST 9/1/2022 4:35 pm TECHNIQUE: CT of the chest was performed without the administration of intravenous contrast. Multiplanar reformatted images are provided for review. Automated exposure control, iterative reconstruction, and/or weight based adjustment of the mA/kV was utilized to reduce the radiation dose to as low as reasonably achievable. COMPARISON: December 7, 2019 HISTORY: ORDERING SYSTEM PROVIDED HISTORY: pneumonia TECHNOLOGIST PROVIDED HISTORY: Reason for exam:->pneumonia Decision Support Exception - unselect if not a suspected or confirmed emergency medical condition->Emergency Medical Condition (MA) What reading provider will be dictating this exam?->CRC FINDINGS: There is borderline cardiac size . There is coronary artery calcification. The great vessels are normal.  There is calcification of the descending thoracic and abdominal aorta with some focal areas of calcified chronic dissection. Trachea and major bronchi are patent. There is advanced emphysema. There is decreased volume of the right lung with multiple thick-walled cavitary lesions in the right upper lobe measuring 8 x 6 cm and similar 1 in the right lower lobe measuring 3 x 9.5 cm with air-fluid level. Additional areas of infiltrates are identified in the right upper lobe and right lower lobe. There is mucous plugging in the right lower lobe. The liver is of normal architecture. The gallbladder is distended. Degenerative changes are identified in the lumbar spine with the compression fracture in the midthoracic spine. Advanced emphysema with patchy and masslike infiltrative densities in the right lung with the cavitary lesion in the right upper lobe and right lower lobe with thick walls and air-fluid levels likely complicated pneumonia/lung abscess. Necrotic malignancy is less likely. Surveillance recommended. Bronchitis and mucous plugging in the right lung. XR CHEST PORTABLE    Result Date: 9/1/2022  EXAMINATION: ONE XRAY VIEW OF THE CHEST 9/1/2022 1:04 pm COMPARISON: 11/20/2019. Correlated with CT of the chest from 12/7/2019.  HISTORY: ORDERING SYSTEM PROVIDED HISTORY: COPD TECHNOLOGIST PROVIDED HISTORY: Reason for exam:->COPD What reading provider will be dictating this exam?->CRC FINDINGS: There are severe emphysematous changes noted bilaterally. Chronic volume loss in the right lung is present along with a large bleb at the right lung apex. There is elevation of the right hemidiaphragm with right pleural thickening versus a small, partially loculated right pleural effusion. Heart size is unable to be accurately assessed on this single portable view of the chest, but appears to be stable. An ambulatory cardiac monitoring device overlies the left chest.  No acute osseous abnormality. Chronic, severe emphysematous changes with bolus emphysema at the right lung apex, elevation of the right hemidiaphragm and chronic right basilar pleural thickening versus a chronic pleural effusion. There is no significant change in the appearance of the chest compared with the November 2019 chest x-ray. Medications       doxycycline (VIBRAMYCIN) IV  100 mg IntraVENous Q12H    polyethylene glycol  17 g Oral Daily    Arformoterol Tartrate  15 mcg Nebulization BID    [Held by provider] aspirin  81 mg Oral Nightly    atorvastatin  40 mg Oral Nightly    ipratropium-albuterol  1 vial Inhalation Q4H WA    metoprolol succinate  37.5 mg Oral BID    pantoprazole  40 mg Oral Daily    tamsulosin  0.4 mg Oral Nightly    cefTRIAXone (ROCEPHIN) IV  1,000 mg IntraVENous Q24H      ADULT DIET; Dysphagia - Soft and Bite Sized    ASSESSMENT/PLAN:  *COPD exacerbation, better  *Pneumonia/right lung lesion, pulmonary following  *Hypertension, monitor blood pressure. Continue same medications  *Hyperlipidemia continue atorvastatin  *Paroxysmal atrial fib on beta-blocker  *History of AAA repair  *History of coronary artery disease  *History of TIA  Continue antibiotics and inhalers.   DVT prophylaxis on PCD's,   bronch if needed        Electronically signed by Mahogany Carr MD on 9/3/2022 at 12:22 PM

## 2022-09-04 NOTE — PROGRESS NOTES
[facility]  [address] [facility], [address]  [facility], [address]      Pulmonary Progress Note      SUBJECTIVE:  claims similar with respiratory function and cough/sputum production (?) close to baseline, lying down in bed on 9 li HFNC. Claims compliant with vest and flutter device use.     OBJECTIVE    Medications    Continuous Infusions:    Scheduled Meds:   enoxaparin  40 mg SubCUTAneous Daily    doxycycline (VIBRAMYCIN) IV  100 mg IntraVENous Q12H    polyethylene glycol  17 g Oral Daily    Arformoterol Tartrate  15 mcg Nebulization BID    [Held by provider] aspirin  81 mg Oral Nightly    atorvastatin  40 mg Oral Nightly    ipratropium-albuterol  1 vial Inhalation Q4H WA    metoprolol succinate  37.5 mg Oral BID    pantoprazole  40 mg Oral Daily    tamsulosin  0.4 mg Oral Nightly    cefTRIAXone (ROCEPHIN) IV  1,000 mg IntraVENous Q24H       PRN Meds:    Physical    VITALS:  BP (!) 142/83   Pulse 94   Temp 98.6 °F (37 °C) (Oral)   Resp 16   Ht 6' 2\" (1.88 m)   Wt 160 lb (72.6 kg)   SpO2 95%   BMI 20.54 kg/m²     24HR INTAKE/OUTPUT:      Intake/Output Summary (Last 24 hours) at 2022 1614  Last data filed at 2022 1549  Gross per 24 hour   Intake 180 ml   Output 600 ml   Net -420 ml         24HR PULSE OXIMETRY RANGE:    SpO2  Av.7 %  Min: 86 %  Max: 100 %    General appearance: alert, appears stated age, and cooperative  Lungs: rhonchi bilaterally with cough  Heart: regular rate and rhythm, S1, S2 normal, no murmur, click, rub or gallop  Abdomen: soft, non-tender; bowel sounds normal; no masses,  no organomegaly  Extremities: extremities normal, atraumatic, no cyanosis or edema  Neurologic: Mental status: Alert, oriented, thought content appropriate    Data    CBC:   Recent Labs     22  0751 22  0423   WBC 17.1* 11.4   HGB 12.3* 12.3*   HCT 37.5 37.4   MCV 90.1 91.9    170         BMP:  Recent Labs     22  0751 09/03/22  0423    137   K 4.6 4.0    105   CO2 24 23   BUN 29* 26*   CREATININE 1.2 1.2      ALB:3,BILIDIR:3,BILITOT:3,ALKPHOS:3)@    PT/INR: No results for input(s): PROTIME, INR in the last 72 hours. ABG:   No results for input(s): PH, PO2, PCO2, HCO3, BE, O2SAT, METHB, O2HB, COHB, O2CON, HHB, THB in the last 72 hours. FiO2 : 100 %       Radiology/Other tests reviewed: none    Assessment:     Principal Problem:    COPD (chronic obstructive pulmonary disease) (HCC)  Active Problems:    Pneumonia  Resolved Problems:    * No resolved hospital problems. *      Plan:       Cont with antibiotics, await sputum culture, (?) pseudomonas again, repeat CXR tomorrow  Cont with nebs, observe respiratory function and cough  Cont with chest vest and flutter device for expectoration  Cont with oxygen, taper as tolerated  OOB to chair, PT/OT      Time at the bedside, reviewing labs and radiographs, reviewing notes and consultations, discussing with staff and family was more than 35 minutes. Thanks for letting us see this patient in consultation. Please contact us with any questions. Office [phone] or after hours through [employer], x [phone].

## 2022-09-04 NOTE — CONSULTS
[address] ELECTROPHYSIOLOGY DEPARTMENT/DIVISION OF CARDIOLOGY  Inpatient consultation Report  PATIENT: Mayuri Phelps  MEDICAL RECORD NUMBER: [id number]  DATE OF SERVICE:  9/4/2022  ATTENDING ELECTROPHYSIOLOGIST:  Nicolás Black DO   REFERRING PHYSICIAN: No ref. provider found and Emily Morgan MD  CHIEF COMPLAINT: Atrial fibrillation    HPI: Mayuri Phelps  is a 79 y.o. male with a history of nonvalvular paroxysmal AF sp Medtronic ILR (DOI: 2019), RBBB, CAD sp PCI LAD ([address] [place] [address]), AAA sp EVAR (2018), HTN, TIA, BPH, COPD, vocal cord/lung cancer sp surgery (2009, 2016). He is managed by Esme Abraham and Gaurang Zhu with aspirin 81 mg daily, Lipitor 40 mg daily, metoprolol XL 37.5 mg twice daily, and PPI. On 9/1/2022, patient presented with chief complaint of dyspnea. He was diagnosed with a toxic respiratory failure/acute exacerbation of COPD. COVID-negative. He was evaluated by pulmonary, who recommended empiric antibiotic therapy and possible bronc to evaluate potential mucous plugging of right lung. [address] [place] [address] Road was held. On telemetry, he was noted to have be in atrial fibrillation. EP service is now consulted by admitting physician for evaluation and management of AF in the setting of acute exacerbation of COPD/hypoxic respiratory failure. Patient denies any other complaints at this time. Prior cardiac testing:  ECG (9/3/2022): AF at 137 bpm, RBBB  ECG (9/1/2022): ST at 111 bpm, RBBB (QRS D = 134 ms), QTC = 470 ms  TTE (2/14/2022): LVEF = 55%, severe concentric LVH (max = 1.5 cm). Pharmacologic nuclear stress test (10/1/2018): LVEF = 63%, normal perfusion except for soft tissue/diaphragmatic attenuation artifact.     Past Medical History:   Diagnosis Date    AF (atrial fibrillation) ([facility] 2013    ONE EPISODE     Arthritis     Aspergillus ([facility] 2018    Asthma     CAD (coronary artery disease)     Cancer (HCC)     vocal cord, and lung    COPD (chronic obstructive pulmonary disease) (HCC)     Hyperlipidemia     Hypertension     Palpitations     Pneumonia due to Pseudomonas ([address]     SOB (shortness of breath)     TIA (transient ischemic attack) 2019     Past Surgical History:   Procedure Laterality Date    ABDOMINAL AORTIC ANEURYSM REPAIR, ENDOVASCULAR      APPENDECTOMY      BACK SURGERY      BRONCHOSCOPY N/A 2019    BRONCHOSCOPY WITH ANESTHESIA performed by Enzo Colindres MD at [address] [facility] [address] N/A 2019    BRONCHOSCOPY BIOPSY BRONCHUS performed by Enzo Colindres MD at [place] [address] CATH LAB PROCEDURE      EYE SURGERY      INSERTABLE CARDIAC MONITOR  2019    LINQ LOOP RECORDER                 DR. Terry Gamble     LUNG CANCER SURGERY          PICC LINE INSERTION NURSE  2020         THROAT SURGERY      for cancer in 2016      Family History   Problem Relation Age of Onset    Other Mother     Other Father      There is no family history of sudden cardiac arrest    Social History     Tobacco Use    Smoking status: Former     Packs/day: 1.00     Years: 33.00     Pack years: 33.00     Types: Cigarettes     Start date: 35     Quit date:      Years since quittin.6    Smokeless tobacco: Never   Substance Use Topics    Alcohol use: No       Current Facility-Administered Medications   Medication Dose Route Frequency Provider Last Rate Last Admin    enoxaparin Sodium (LOVENOX) injection 30 mg  30 mg SubCUTAneous Daily Radha Arthur MD        doxycycline (VIBRAMYCIN) 100 mg in dextrose 5 % 100 mL IVPB ([id number])  100 mg IntraVENous Q12H XAVI Marques - CNP   Stopped at 22 1115    polyethylene glycol (GLYCOLAX) packet 17 g  17 g Oral Daily Devante Hopson MD   17 g at 22 1841    Arformoterol Tartrate (BROVANA) nebulizer solution 15 mcg  15 mcg Nebulization BID Ofelia Anthony DO   15 mcg at 22 0835    [Held by provider] aspirin EC tablet 81 mg  81 mg Oral Nightly Vanessa Remedies Alexandra, DO   81 mg at 09/01/22 2036    atorvastatin (LIPITOR) tablet 40 mg  40 mg Oral Nightly Colin Prado Ledger, DO   40 mg at 09/03/22 2013    ipratropium-albuterol (DUONEB) nebulizer solution 3 mL  1 vial Inhalation Q4H WA Colin Prado Ledger, DO   3 mL at 09/04/22 0836    metoprolol succinate (TOPROL XL) extended release tablet 37.5 mg  37.5 mg Oral BID Iniguez Kenneth, DO   37.5 mg at 09/04/22 4198    pantoprazole (PROTONIX) tablet 40 mg  40 mg Oral Daily Colin Prado Ledger, DO   40 mg at 09/04/22 7104    tamsulosin (FLOMAX) capsule 0.4 mg  0.4 mg Oral Nightly Colin Prado Ledger, DO   0.4 mg at 09/03/22 2013    cefTRIAXone (ROCEPHIN) 1,000 mg in sterile water 10 mL IV syringe  1,000 mg IntraVENous Q24H Hira Kenneth, DO   1,000 mg at 09/03/22 1351        Allergies   Allergen Reactions    Sulfa Antibiotics Rash    Vancomycin Rash and Other (See Comments)    Trimethoprim        ROS:   Constitutional: Negative for fever, activity change and appetite change. HENT: Negative for epistaxis. Eyes: Negative for diploplia, blurred vision. Respiratory: Negative for cough, chest tightness, shortness of breath and wheezing. Cardiovascular: pertinent positives in HPI  Gastrointestinal: Negative for abdominal pain and blood in stool. Genitourinary: Negative for hematuria and difficulty urinating. Musculoskeletal: Negative for myalgias and gait problem. Skin: Negative for color change and rash. Neurological: Negative for syncope and light-headedness. Psychiatric/Behavioral: Negative for confusion and agitation. The patient is not nervous/anxious.   Heme: no bleeding disorders, no melena or hematochezia  All other review of systems are negative     PHYSICAL EXAM:  Constitutional   Vitals:    09/03/22 1941 09/03/22 2010 09/04/22 0752 09/04/22 0835   BP:  115/72 (!) 142/83    Pulse:  (!) 113 94    Resp:  22 16    Temp:  99 °F (37.2 °C) 98.6 °F (37 °C)    TempSrc:  Oral Oral    SpO2: (!) 86% 93% 100% 93%   Weight:       Height:        Well-developed, no acute distress, well groomed  Eyes: conjunctivae normal, no xanthelasma   Ears, Nose, Throat: oral mucosa moist, no cyanosis   Neck: supple, no JVD, no bruits, no thyromegaly   CV: normal rate, regular rhythm,  no murmurs, rubs, or gallops. PMI is nondisplaced, Peripheral pulses normal including carotid auscultation, no noted aortic bruit, bilateral femoral and pedal pulses are normal in quality  Lungs: clear to auscultation bilaterally, normal respiratory effort without used of accessory muscles, no wheezes  Abdomen: soft, non-tender, bowel sounds present, no masses or hepatomegaly   Extremities: no digital clubbing, no edema   Skin: warm, no rashes   Neuro/Psych: A&O x 3, normal mood and affect    Data:    Recent Labs     09/02/22  0751 09/03/22  0423   WBC 17.1* 11.4   HGB 12.3* 12.3*   HCT 37.5 37.4    170     Recent Labs     09/02/22  0751 09/03/22  0423    137   K 4.6 4.0    105   CO2 24 23   BUN 29* 26*   CREATININE 1.2 1.2   CALCIUM 8.6 8.3*     No results for input(s): INR in the last 72 hours. No results for input(s): TSH in the last 72 hours. No results found for: MG    Telemetry: SR at 80 bpm converted to AF at 1115-140 bpm on 9/3/22 at 0900 then converted to SR at 90 -120 bpm on 9/3/22 at 2000, remains in [place] [facility] with PACs     Assessment/plan:  1. nonvalvular paroxysmal AF sp Medtronic ILR (DOI: 2019)  -[id number] score = 4 (age, CAD, HTN). Recommend [address] in males with score of 1 or more. DOAC preferred. - He declined OAC's in the past.  Reported history of epistaxis with apixaban. His creatinine clearance is 45. Recommend discontinue prophylactic Lovenox and start Xarelto 15 mg daily with discontinuation of aspirin when okay with other providers. There is discussion regarding potential bronchoscopy in near future. Additionally recommend humidified air for nasal cannula O2.   In future, if recurrent epistaxis recommend ENT eval.  - Continue metoprolol XL 37.5 mg twice daily, which is his home dose. - He is now in [place] [facility], but if he has a recurrence of AF during [facility] admit, recommend relative rate control (less than 140 bpm) in the setting of acute hypoxic respiratory failure/COPD exacerbation. After resolution of acute medical issues, can reevaluate need for rhythm control intervention. Recommend metoprolol IV as needed. - Modifiable risk factors:  --Obesity: BMI goal less than 27.  --JOHAN: Denies JOHAN symptoms.  --HTN: BP goal less than 130/80. --Alcohol: Continue to avoid. -Follow-up with established EP in 1 month. My office will arrange. -EP service will sign off. Please contact with questions concerns. 2.  Acute exacerbation COPD  - Management per pulmonary. On empiric antibiotics. He has increasing O2 demands, currently 9 L nasal cannula O2. I have spent a total of 45 minutes with the patient and his/her family reviewing the above stated recommendations. And a total of >50% of that time involved face-to-face time providing counseling and or coordination of care with the other providers. Thank you for allowing me to participate in your patient's care. Please call me if there are any questions.       Marivel Myers, DO   Cardiac Electrophysiology  [facility]  [facility]

## 2022-09-04 NOTE — PROGRESS NOTES
General cardiology and EP consulted for EP. Patient known to EP will defer consult to them. Electronically signed by Lillie Gasca.  KYLE Osorio on 9/4/2022 at 7:18 AM

## 2022-09-04 NOTE — PROGRESS NOTES
Dr. Arjun Urbano MD,    Your patient is on a medication that requires a renal and/or weight dose adjustment. Renal/Body Weight Function Assessment:    Date Body Weight IBW  Adjusted BW SCr  CrCl Dialysis status   9/4/2022 160 lb (72.6 kg) Ideal body weight: 82.2 kg (181 lb 3.5 oz) Serum creatinine: 1.2 mg/dL 09/03/22 0423  Estimated creatinine clearance: 48 mL/min N/a       Pharmacy has dose-adjusted the following medication(s):    Date Previous Order Adjusted Order   9/4/2022 Lovenox 30 mg qd Lovenox 40 qd       These changes were made per protocol according to the [facility] Renal Dosing Policy/ [facility] Pharmacist Anticoagulant Review. *Please note this dose may need readjusted if your patient's condition changes. Please contact pharmacy with any questions regarding these changes.     Maddy Mai, PharmD, BCPS 9/4/2022 2:41 PM

## 2022-09-04 NOTE — PLAN OF CARE
Problem: Pain  Goal: Verbalizes/displays adequate comfort level or baseline comfort level  9/4/2022 0947 by Martin Michele RN  Outcome: Progressing     Problem: Discharge Planning  Goal: Discharge to home or other facility with appropriate resources  Outcome: Progressing

## 2022-09-04 NOTE — PROGRESS NOTES
Internal Medicine   Progress Note    Admit Date: 9/1/2022  Hospital day:    Hospital Day: 4  SUBJECTIVE:  No new acute problems overnight. Doing OK. No chest pain. Continues to have shortness of breath. No nausea or vomiting. No fever. No abdominal pain. OBJECTIVE:     BP (!) 142/83   Pulse 94   Temp 98.6 °F (37 °C) (Oral)   Resp 16   Ht 6' 2\" (1.88 m)   Wt 160 lb (72.6 kg)   SpO2 93%   BMI 20.54 kg/m²   Patient Vitals for the past 24 hrs:   BP Temp Temp src Pulse Resp SpO2   09/04/22 0835 -- -- -- -- -- 93 %   09/04/22 0752 (!) 142/83 98.6 °F (37 °C) Oral 94 16 100 %   09/03/22 2010 115/72 99 °F (37.2 °C) Oral (!) 113 22 93 %   09/03/22 1941 -- -- -- -- -- (!) 86 %   09/03/22 1645 (!) 102/59 -- -- (!) 146 22 95 %   09/03/22 1230 -- -- -- -- -- 93 %     24HR INTAKE/OUTPUT:    Intake/Output Summary (Last 24 hours) at 9/4/2022 1147  Last data filed at 9/4/2022 0944  Gross per 24 hour   Intake 240 ml   Output 600 ml   Net -360 ml      GENERAL:  Alert,breathing easily, not in apparent acute distress. LUNGS: Short of breath, occasional Rales  CARDIOVASCULAR:  S1 and S2 regular to auscultate. ABDOMEN:  Soft, non-tender. Bowel sounds present  NEUROLOGIC:  Alert, and oriented.   No gross focal deficit  SKIN:  unremarkable  EXTREMITIES: edema none  Data      Recent Labs     09/01/22  1212 09/02/22  0751 09/03/22  0423   WBC 24.0* 17.1* 11.4   HGB 14.5 12.3* 12.3*    168 170     Recent Labs     09/01/22  1212 09/02/22  0751 09/03/22  0423    138 137   K 4.2 4.6 4.0    105 105   CO2 22 24 23   BUN 29* 29* 26*   CREATININE 1.2 1.2 1.2   GLUCOSE 115* 85 82     Recent Labs     09/01/22  1212   AST 20   ALT 22   BILITOT 1.1   ALKPHOS 105       CT CHEST WO CONTRAST    Result Date: 9/1/2022  EXAMINATION: CT OF THE CHEST WITHOUT CONTRAST 9/1/2022 4:35 pm TECHNIQUE: CT of the chest was performed without the administration of intravenous contrast. Multiplanar reformatted images are provided for review. Automated exposure control, iterative reconstruction, and/or weight based adjustment of the mA/kV was utilized to reduce the radiation dose to as low as reasonably achievable. COMPARISON: December 7, 2019 HISTORY: ORDERING SYSTEM PROVIDED HISTORY: pneumonia TECHNOLOGIST PROVIDED HISTORY: Reason for exam:->pneumonia Decision Support Exception - unselect if not a suspected or confirmed emergency medical condition->Emergency Medical Condition (MA) What reading provider will be dictating this exam?->CRC FINDINGS: There is borderline cardiac size . There is coronary artery calcification. The great vessels are normal.  There is calcification of the descending thoracic and abdominal aorta with some focal areas of calcified chronic dissection. Trachea and major bronchi are patent. There is advanced emphysema. There is decreased volume of the right lung with multiple thick-walled cavitary lesions in the right upper lobe measuring 8 x 6 cm and similar 1 in the right lower lobe measuring 3 x 9.5 cm with air-fluid level. Additional areas of infiltrates are identified in the right upper lobe and right lower lobe. There is mucous plugging in the right lower lobe. The liver is of normal architecture. The gallbladder is distended. Degenerative changes are identified in the lumbar spine with the compression fracture in the midthoracic spine. Advanced emphysema with patchy and masslike infiltrative densities in the right lung with the cavitary lesion in the right upper lobe and right lower lobe with thick walls and air-fluid levels likely complicated pneumonia/lung abscess. Necrotic malignancy is less likely. Surveillance recommended. Bronchitis and mucous plugging in the right lung. XR CHEST PORTABLE    Result Date: 9/1/2022  EXAMINATION: ONE XRAY VIEW OF THE CHEST 9/1/2022 1:04 pm COMPARISON: 11/20/2019. Correlated with CT of the chest from 12/7/2019.  HISTORY: ORDERING SYSTEM PROVIDED HISTORY: COPD TECHNOLOGIST PROVIDED HISTORY: Reason for exam:->COPD What reading provider will be dictating this exam?->CRC FINDINGS: There are severe emphysematous changes noted bilaterally. Chronic volume loss in the right lung is present along with a large bleb at the right lung apex. There is elevation of the right hemidiaphragm with right pleural thickening versus a small, partially loculated right pleural effusion. Heart size is unable to be accurately assessed on this single portable view of the chest, but appears to be stable. An ambulatory cardiac monitoring device overlies the left chest.  No acute osseous abnormality. Chronic, severe emphysematous changes with bolus emphysema at the right lung apex, elevation of the right hemidiaphragm and chronic right basilar pleural thickening versus a chronic pleural effusion. There is no significant change in the appearance of the chest compared with the November 2019 chest x-ray. Medications       doxycycline (VIBRAMYCIN) IV  100 mg IntraVENous Q12H    polyethylene glycol  17 g Oral Daily    Arformoterol Tartrate  15 mcg Nebulization BID    [Held by provider] aspirin  81 mg Oral Nightly    atorvastatin  40 mg Oral Nightly    ipratropium-albuterol  1 vial Inhalation Q4H WA    metoprolol succinate  37.5 mg Oral BID    pantoprazole  40 mg Oral Daily    tamsulosin  0.4 mg Oral Nightly    cefTRIAXone (ROCEPHIN) IV  1,000 mg IntraVENous Q24H      ADULT DIET; Dysphagia - Soft and Bite Sized    ASSESSMENT/PLAN:  *COPD exacerbation continues to be short of breath, pulmonary following  *Pneumonia/right lung lesion monitor  *Hypertension stable  *Hyperlipidemia  Breathing treatments  Continue doxycycline and ceftriaxone.   DVT prophylaxis we will start on Lovenox if not going to have any procedures      Electronically signed by Venus Anderson MD on 9/4/2022 at 11:47 AM

## 2022-09-05 NOTE — PROGRESS NOTES
[facility] and [address]  [address] [facility], [address] [place] [address], 17 [facility]      Pulmonary Progress Note      SUBJECTIVE:  claims no change with respiratory function and cough/sputum production (?) close to baseline, lying down in bed on 9-10 li HFNC. Claims compliant with vest and flutter device use.     OBJECTIVE    Medications    Continuous Infusions:    Scheduled Meds:   enoxaparin  40 mg SubCUTAneous Daily    doxycycline (VIBRAMYCIN) IV  100 mg IntraVENous Q12H    polyethylene glycol  17 g Oral Daily    Arformoterol Tartrate  15 mcg Nebulization BID    [Held by provider] aspirin  81 mg Oral Nightly    atorvastatin  40 mg Oral Nightly    ipratropium-albuterol  1 vial Inhalation Q4H WA    metoprolol succinate  37.5 mg Oral BID    pantoprazole  40 mg Oral Daily    tamsulosin  0.4 mg Oral Nightly    cefTRIAXone (ROCEPHIN) IV  1,000 mg IntraVENous Q24H       PRN Meds:    Physical    VITALS:  /68   Pulse (!) 101   Temp 98.7 °F (37.1 °C) (Oral)   Resp 20   Ht 6' 2\" (1.88 m)   Wt 160 lb (72.6 kg)   SpO2 97%   BMI 20.54 kg/m²     24HR INTAKE/OUTPUT:      Intake/Output Summary (Last 24 hours) at 2022 1338  Last data filed at 2022 0926  Gross per 24 hour   Intake 420 ml   Output 700 ml   Net -280 ml         24HR PULSE OXIMETRY RANGE:    SpO2  Av.9 %  Min: 90 %  Max: 97 %    General appearance: alert, appears stated age, and cooperative  Lungs: rhonchi bilaterally with cough  Heart: regular rate and rhythm, S1, S2 normal, no murmur, click, rub or gallop  Abdomen: soft, non-tender; bowel sounds normal; no masses,  no organomegaly  Extremities: extremities normal, atraumatic, no cyanosis or edema  Neurologic: Mental status: Alert, oriented, thought content appropriate    Data    CBC:   Recent Labs     22   WBC 11.4   HGB 12.3*   HCT 37.4   MCV 91.9            BMP:  Recent Labs     22      K 4.0      CO2 23 BUN 26*   CREATININE 1.2      ALB:3,BILIDIR:3,BILITOT:3,ALKPHOS:3)@    PT/INR: No results for input(s): PROTIME, INR in the last 72 hours. ABG:   No results for input(s): PH, PO2, PCO2, HCO3, BE, O2SAT, METHB, O2HB, COHB, O2CON, HHB, THB in the last 72 hours. FiO2 : 100 %       Radiology/Other tests reviewed: CXR reviewed looking slightly better aerated right upper lung field and similar patchy opacity right lower lung field    Assessment:     Principal Problem:    COPD (chronic obstructive pulmonary disease) ([facility])  Active Problems:    Pneumonia  Resolved Problems:    * No resolved hospital problems. *      Plan:       Cont with antibiotics, pansensitive pseudomonas, can change to po for total course of 10-14 days  Cont with nebs, observe respiratory function and cough  Cont with chest vest and flutter device for expectoration  Cont with oxygen, taper as tolerated  OOB to chair, PT/OT, may have some debility as reason for dyspnea, (?) rehab needed      Time at the bedside, reviewing labs and radiographs, reviewing notes and consultations, discussing with staff and family was more than 35 minutes. Thanks for letting us see this patient in consultation. Please contact us with any questions. Office [phone] or after hours through [employer], [phone].

## 2022-09-05 NOTE — DISCHARGE INSTRUCTIONS
Electrophysiology  - Medications:  -- Anticoagulation: We have recommended you start Xarelto (rivaroxaban) 15 mg tablet, take 1 tablet daily by mouth with food. If this was not prescribed at the time of discharge, please contact the office at the number listed below so we may review and prescribe if felt to be necessary. -- Metoprolol succinate: Continue 37.5 mg every 12 hours. - Humidified oxygen: We have recommended you use humidified oxygen in an effort to avoid nosebleeds. If this was not prescribed at the time of discharge, please contact your pulmonologist to request.  - Follow-up: Dr Edmund [place] office in ~1 month.   - Questions/concerns: Please contact the office at [phone].  ----------------------------------------------------------------------------------------------------------------------------------------------------

## 2022-09-05 NOTE — PROGRESS NOTES
Internal Medicine   Progress Note    Admit Date: 9/1/2022  Hospital day:    Hospital Day: 5  SUBJECTIVE:  No new acute problems overnight. Doing OK. No chest pain. Continues to have shortness of breath. No nausea or vomiting. No fever. No abdominal pain. OBJECTIVE:     /63   Pulse (!) 109   Temp 98.7 °F (37.1 °C) (Oral)   Resp 22   Ht 6' 2\" (1.88 m)   Wt 160 lb (72.6 kg)   SpO2 90%   BMI 20.54 kg/m²   Patient Vitals for the past 24 hrs:   BP Temp Temp src Pulse Resp SpO2   09/04/22 2030 121/63 98.7 °F (37.1 °C) Oral (!) 109 22 90 %   09/04/22 1950 -- -- -- -- -- 96 %   09/04/22 1800 129/68 99.4 °F (37.4 °C) Temporal (!) 114 20 92 %   09/04/22 1554 -- -- -- -- -- 95 %   09/04/22 0835 -- -- -- -- -- 93 %   09/04/22 0752 (!) 142/83 98.6 °F (37 °C) Oral 94 16 100 %     24HR INTAKE/OUTPUT:    Intake/Output Summary (Last 24 hours) at 9/5/2022 0131  Last data filed at 9/4/2022 1809  Gross per 24 hour   Intake 180 ml   Output 400 ml   Net -220 ml      GENERAL:  Alert, slightly dyspneic, not in apparent acute distress. LUNGS: Breath sounds decreased, clear to auscultation bilaterally. CARDIOVASCULAR:  S1 and S2 regular to auscultate. ABDOMEN:  Soft, non-tender. Bowel sounds present  NEUROLOGIC:  Alert, and oriented. No gross focal deficit    Data    Recent Labs     09/02/22 0751 09/03/22 0423   WBC 17.1* 11.4   HGB 12.3* 12.3*    170     Recent Labs     09/02/22 0751 09/03/22 0423    137   K 4.6 4.0    105   CO2 24 23   BUN 29* 26*   CREATININE 1.2 1.2   GLUCOSE 85 82       CT CHEST WO CONTRAST    Result Date: 9/1/2022  EXAMINATION: CT OF THE CHEST WITHOUT CONTRAST 9/1/2022 4:35 pm TECHNIQUE: CT of the chest was performed without the administration of intravenous contrast. Multiplanar reformatted images are provided for review.  Automated exposure control, iterative reconstruction, and/or weight based adjustment of the mA/kV was utilized to reduce the radiation dose to as low as reasonably achievable. COMPARISON: December 7, 2019 HISTORY: ORDERING SYSTEM PROVIDED HISTORY: pneumonia TECHNOLOGIST PROVIDED HISTORY: Reason for exam:->pneumonia Decision Support Exception - unselect if not a suspected or confirmed emergency medical condition->Emergency Medical Condition (MA) What reading provider will be dictating this exam?->CRC FINDINGS: There is borderline cardiac size . There is coronary artery calcification. The great vessels are normal.  There is calcification of the descending thoracic and abdominal aorta with some focal areas of calcified chronic dissection. Trachea and major bronchi are patent. There is advanced emphysema. There is decreased volume of the right lung with multiple thick-walled cavitary lesions in the right upper lobe measuring 8 x 6 cm and similar 1 in the right lower lobe measuring 3 x 9.5 cm with air-fluid level. Additional areas of infiltrates are identified in the right upper lobe and right lower lobe. There is mucous plugging in the right lower lobe. The liver is of normal architecture. The gallbladder is distended. Degenerative changes are identified in the lumbar spine with the compression fracture in the midthoracic spine. Advanced emphysema with patchy and masslike infiltrative densities in the right lung with the cavitary lesion in the right upper lobe and right lower lobe with thick walls and air-fluid levels likely complicated pneumonia/lung abscess. Necrotic malignancy is less likely. Surveillance recommended. Bronchitis and mucous plugging in the right lung. XR CHEST PORTABLE    Result Date: 9/1/2022  EXAMINATION: ONE XRAY VIEW OF THE CHEST 9/1/2022 1:04 pm COMPARISON: 11/20/2019. Correlated with CT of the chest from 12/7/2019.  HISTORY: ORDERING SYSTEM PROVIDED HISTORY: COPD TECHNOLOGIST PROVIDED HISTORY: Reason for exam:->COPD What reading provider will be dictating this exam?->CRC FINDINGS: There are severe emphysematous changes noted bilaterally. Chronic volume loss in the right lung is present along with a large bleb at the right lung apex. There is elevation of the right hemidiaphragm with right pleural thickening versus a small, partially loculated right pleural effusion. Heart size is unable to be accurately assessed on this single portable view of the chest, but appears to be stable. An ambulatory cardiac monitoring device overlies the left chest.  No acute osseous abnormality. Chronic, severe emphysematous changes with bolus emphysema at the right lung apex, elevation of the right hemidiaphragm and chronic right basilar pleural thickening versus a chronic pleural effusion. There is no significant change in the appearance of the chest compared with the November 2019 chest x-ray. Medications       enoxaparin  40 mg SubCUTAneous Daily    doxycycline (VIBRAMYCIN) IV  100 mg IntraVENous Q12H    polyethylene glycol  17 g Oral Daily    Arformoterol Tartrate  15 mcg Nebulization BID    [Held by provider] aspirin  81 mg Oral Nightly    atorvastatin  40 mg Oral Nightly    ipratropium-albuterol  1 vial Inhalation Q4H WA    metoprolol succinate  37.5 mg Oral BID    pantoprazole  40 mg Oral Daily    tamsulosin  0.4 mg Oral Nightly    cefTRIAXone (ROCEPHIN) IV  1,000 mg IntraVENous Q24H      ADULT DIET; Dysphagia - Soft and Bite Sized    ASSESSMENT/PLAN:  *Severe COPD, pulm following, on nasal O2, Continue nebulizers.   *Pneumonia continue Rocephin and Doxy  *Atrial fibrillation with RVR, cardiology consulted  *Hypertension  *Hyperlipidemia  *Breathing treatments, continue doxycycline and ceftriaxone   DVT prophylaxis, Lovenox ordered, hold if going for any procedures          Electronically signed by Paul Sandoval MD on 9/5/2022 at 1:31 AM

## 2022-09-05 NOTE — PLAN OF CARE
Problem: Pain  Goal: Verbalizes/displays adequate comfort level or baseline comfort level  Outcome: Progressing     Problem: Discharge Planning  Goal: Discharge to home or other facility with appropriate resources  Outcome: Progressing  Flowsheets (Taken 9/5/2022 0103 by Ysabel Gonzalez RN)  Discharge to home or other facility with appropriate resources:   Identify barriers to discharge with patient and caregiver   Identify discharge learning needs (meds, wound care, etc)   Arrange for needed discharge resources and transportation as appropriate   Refer to discharge planning if patient needs post-hospital services based on physician order or complex needs related to functional status, cognitive ability or social support system   Arrange for interpreters to assist at discharge as needed

## 2022-09-06 NOTE — TELEPHONE ENCOUNTER
----- Message from Diane Weeks sent at 9/6/2022  1:23 PM EDT -----  Regarding: FW: appt    ----- Message -----  From: Miroslava Reddy DO  Sent: 9/4/2022  12:44 PM EDT  To: Richie Narayanan  Subject: appt                                             Cho or OSWALDO in 1 month.   Miroslava Reddy DO

## 2022-09-06 NOTE — PROGRESS NOTES
[facility] and [address]  [address], [address]  [facility], [address] [place] [address]      Pulmonary Progress Note      SUBJECTIVE:  has gotten worse with breathing today, mention of desaturation so oxygen supplementation increased to 13 li HFNC and received dose of morphine. Mention of hospice evaluation and change in code status.     OBJECTIVE    Medications    Continuous Infusions:    Scheduled Meds:   cefTRIAXone (ROCEPHIN) IV  1,000 mg IntraVENous Q24H    doxycycline (VIBRAMYCIN) IV  100 mg IntraVENous Q12H    enoxaparin  40 mg SubCUTAneous Daily    polyethylene glycol  17 g Oral Daily    Arformoterol Tartrate  15 mcg Nebulization BID    [Held by provider] aspirin  81 mg Oral Nightly    atorvastatin  40 mg Oral Nightly    ipratropium-albuterol  1 vial Inhalation Q4H WA    metoprolol succinate  37.5 mg Oral BID    pantoprazole  40 mg Oral Daily    tamsulosin  0.4 mg Oral Nightly       PRN Meds:    Physical    VITALS:  /77   Pulse (!) 114   Temp 99.4 °F (37.4 °C) (Axillary)   Resp 30   Ht 6' 2\" (1.88 m)   Wt 160 lb (72.6 kg)   SpO2 94%   BMI 20.54 kg/m²     24HR INTAKE/OUTPUT:      Intake/Output Summary (Last 24 hours) at 2022 1430  Last data filed at 2022 1201  Gross per 24 hour   Intake --   Output 100 ml   Net -100 ml         24HR PULSE OXIMETRY RANGE:    SpO2  Av.3 %  Min: 87 %  Max: 95 %    General appearance: alert, appears stated age, and cooperative  Lungs: rhonchi bilaterally with cough  Heart: regular rate and rhythm, S1, S2 normal, no murmur, click, rub or gallop  Abdomen: soft, non-tender; bowel sounds normal; no masses,  no organomegaly  Extremities: extremities normal, atraumatic, no cyanosis or edema  Neurologic: Mental status: Alert, oriented, thought content appropriate    Data    CBC:   Recent Labs     22  1327   WBC 14.9*   HGB 12.5   HCT 38.8   MCV 92.8            BMP:  No results for input(s): NA, K, CL, CO2, PHOS, BUN, CREATININE, CA in the last 72 hours. ALB:3,BILIDIR:3,BILITOT:3,ALKPHOS:3)@    PT/INR: No results for input(s): PROTIME, INR in the last 72 hours. ABG:   No results for input(s): PH, PO2, PCO2, HCO3, BE, O2SAT, METHB, O2HB, COHB, O2CON, HHB, THB in the last 72 hours. FiO2 : 100 %       Radiology/Other tests reviewed: CXR reviewed looking slightly better aerated right upper lung field and similar patchy opacity right lower lung field    Assessment:     Principal Problem:    COPD (chronic obstructive pulmonary disease) ([facility])  Active Problems:    Pneumonia  Resolved Problems:    * No resolved hospital problems. *      Plan:       Cont with antibiotics, pansensitive pseudomonas, can change to po for total course of 10-14 days  Cont with nebs, observe respiratory function and cough  Cont with chest vest and flutter device for expectoration  Cont with oxygen, decreased to 11 li as was saturating about 99%, discussed with nurse, taper further as tolerated  Await hospice evaluation and eventual decision by pt and family about direction of care. Has been gradually getting worse the past year, not sure will/can be significantly better, can hope amount of cough and sputum production will be less though hasn't happened despite being on antibiotic since admission 5 days ago. Time at the bedside, reviewing labs and radiographs, reviewing notes and consultations, discussing with staff and family was more than 35 minutes. Thanks for letting us see this patient in consultation. Please contact us with any questions. Office [phone] or after hours through [employer], [phone].

## 2022-09-06 NOTE — PROGRESS NOTES
[facility]     Liaison Information Visit Note              Patient Name: Shari Arenas    Terminal Diagnosis  End Stage COPD  Code Status Order: DNR-CC   Allergies:  Sulfa antibiotics, Vancomycin, and Trimethoprim  Family Goal: Comfort Care  Meeting held with Spouse, Pete Arguello with patient and his wife Dangelo Dean at bedside. The hospice benefit and philosophy were explained including that hospice is end of life care in which, per Medicare, a patient has a terminal diagnosis that life expectancy would be 6 months or less. Explained that once in hospice care, all aggressive treatments would be stopped and allow nature to takes its course with focus on comfort care for the patient. Hospice care is a service that is covered by most insurance plans. Explained hospice services at home, at [facility] with room/board private pay unless patient has Medicaid and the [facility] for short term symptom management and respite stay. Dangelo Dean would like Mei Nix to go to [facility] and then will decide home or nursing home if needed. Mei Nix is 79year old with End stage copd. Came in with PNA. He is on 13L high flow nasal cannula. On antibiotics, chest pt, and breathing treatments. He continues to have dyspnea with accessory muscle use and audible gurgling. He has had morphine 2mg x2 and ativan 0.5mg x1. Without relief. Felipe Valencia APRN-CNP accepted patient at [facility] for inpatient level of care hospice. Receieved bed 115 with a requested transport time of ASAP. [facility] able to transport at 2100. [facility] unable to transport any sooner. Updated bedside nurse. Requested she leave IV in place and obtain discharge order. Signed consents with spouse. Gave nurse to nurse report to Alana at [facility]. Discharge Plan:  Discharge Disposition; [facility]  HOTV plan:  Admit at [facility]  Please call Kristopher Khan [phone] with any questions.   Patient not currently under the care of hospice.     Electronically signed by Zaira Arenas RN on 9/6/2022 at 4:22 PM

## 2022-09-06 NOTE — TELEPHONE ENCOUNTER
Patient is scheduled with NP on 10/18/2022 @ 11:00AM. He will get appointment upon discharge, if it does not work for the patient they will call back to R/S.

## 2022-09-06 NOTE — PROGRESS NOTES
[facility]                                Progress note    Subjective:    Patient and wife wish for comfort measures  He indicates he does not want to pursue any further aggressive care  Requesting DNR CCA status and hospice    Objective:    BP (!) 161/95   Pulse (!) 112   Temp 98.5 °F (36.9 °C) (Axillary)   Resp (!) 36   Ht 6' 2\" (1.88 m)   Wt 160 lb (72.6 kg)   SpO2 92%   BMI 20.54 kg/m²     In: 240 [P.O.:240]  Out: 500   In: 240   Out: 500 [Urine:500]    General appearance: NAD, conversant, chronically ill-appearing dyspneic man laying in bed appears uncomfortable  HEENT: AT/NC, MMM  Neck: FROM, supple  Lungs: Coarse rhonchi bilateral lung field  CV: RRR, no MRGs  Vasc: Radial pulses 2+  Abdomen: Soft, non-tender; no masses or HSM  Extremities: No peripheral edema or digital cyanosis  Skin: no rash, lesions or ulcers  Psych: Alert and oriented to person, place and time  Neuro: Alert and interactive     No results for input(s): WBC, HGB, HCT, PLT in the last 72 hours. No results for input(s): NA, K, CL, CO2, BUN, CREATININE, GLU, CALCIUM in the last 72 hours. Assessment:    Principal Problem:    COPD (chronic obstructive pulmonary disease) ([facility])  Active Problems:    Pneumonia  Resolved Problems:    * No resolved hospital problems. *      Plan:  79-year-old male with a history of COPD and O2 needs at home presents to the ED with complaints of shortness of breath and low pulse ox saturation and history of aspergillus is admitted to [place] [address] unit with     COPD exacerbation due to pneumonia with chronic hypoxemic respiratory failure  -Supplement O2 demands keeping oxygen saturation greater than 92%.   Currently utilizing 13 L for saturation 96%  -Rocephin/Doxy pansensitive pseudomonas will likely changed to po on discharge  -DuoNebs /chest vest  -Swallow study questionable aspiration pneumonia  -Strep/Legionella, follow Pro-Bill, repeat a.m. lab  -Consult pulmonary-appreciate input  -Pulmonary hygiene/ISP/flutter  -Check blood cultures as patient is having shaking chills on my evaluation today  Wean O2   Out of bed to chair  Morphine every 4 hours as needed and Ativan p.o. every 8 hours for comfort and air hunger  Hospice consult    Afib  EP consulted - Recommending Xarelto 15 mg upon discharge.   Patient does have a history of epistaxis with eliquis  Rate control - currently 112 - Toprol XL 37.5     DVT Prophylaxis   PT/OT  Discharge planning-patient and family requesting hospice-CODE STATUS changed to DNR comfort care only      Vane Matos MD  2:21 PM  9/6/2022

## 2022-09-07 NOTE — DISCHARGE SUMMARY
[facility]   Discharge summary   Patient ID:  Benson Neigh  [id number]  17 y.o.  1940    Admit date: 9/1/2022    Discharge date and time: 9/6/2022    Admission Diagnoses:   Patient Active Problem List   Diagnosis    Mixed hyperlipidemia    Essential hypertension    COPD (chronic obstructive pulmonary disease) (HCC)    Abdominal aortic aneurysm (AAA) without rupture (HCC)    Paroxysmal atrial fibrillation (HCC)    RBBB (right bundle branch block with left anterior fascicular block)    Coronary artery disease involving native coronary artery of native heart without angina pectoris    Aspergillosis, with pneumonia (HCC)    TIA (transient ischemic attack)    Pseudomonas pneumonia ([facility] [address]    S/P AAA repair using bifurcation graft    Constipation    UTI (urinary tract infection)    Pneumonia       Discharge Diagnoses: PNA    Consults: pulmonary/intensive care and hospice    Procedures: None    Hospital Course: The patient is a 79 y.o. male of Bedelia Spurling, MD     74-year-old male with a history of COPD and O2 needs at home presents to the ED with complaints of shortness of breath and low pulse ox saturation and history of aspergillus is admitted to [place] [address] unit with     COPD exacerbation due to pneumonia with chronic hypoxemic respiratory failure  -Supplement O2 demands keeping oxygen saturation greater than 92%.   Currently utilizing 13 L for saturation 96%  -Rocephin/Doxy pansensitive pseudomonas will likely changed to po on discharge  -DuoNebs /chest vest  -Swallow study questionable aspiration pneumonia  -Strep/Legionella, follow Pro-Bill, repeat a.m. lab  -Consult pulmonary-appreciate input  -Pulmonary hygiene/ISP/flutter  -Check blood cultures as patient is having shaking chills on my evaluation today  Wean O2   Out of bed to chair  Morphine every 4 hours as needed and Ativan p.o. every 8 hours for comfort and air hunger  Hospice consult     Afib  EP consulted - Recommending Xarelto 15 mg upon discharge. Patient does have a history of epistaxis with eliquis  Rate control - currently 112 - Toprol XL 37.5     Patient discharge to hospice house. Recent Labs     09/06/22  1327   WBC 14.9*   HGB 12.5   HCT 38.8          Recent Labs     09/06/22  1327      K 4.5      CO2 20*   BUN 32*   CREATININE 1.1   CALCIUM 8.5*       XR CHEST PORTABLE    Result Date: 9/6/2022  EXAMINATION: ONE XRAY VIEW OF THE CHEST 9/6/2022 8:32 am COMPARISON: 09/05/2022. HISTORY: ORDERING SYSTEM PROVIDED HISTORY: Increased oxygen demand TECHNOLOGIST PROVIDED HISTORY: Reason for exam:->Increased oxygen demand What reading provider will be dictating this exam?->CRC FINDINGS: The cardiac silhouette is unchanged in size. There are chronic pleural and parenchymal changes on the right including mixed emphysematous and fibrotic changes along with significant right lung volume loss. There also chronic parenchymal changes in the left lung. The overall appearance of the chest is unchanged. Postsurgical changes and loop recorder are noted. Stable chronic findings. Discharge Exam:    HEENT: NCAT,  PERRLA, No JVD  Heart:  RRR, no murmurs, gallops, or rubs.   Lungs:  CTA bilaterally, no wheeze, rales or rhonchi  Abd: bowel sounds present, nontender, nondistended, no masses  Extrem:  No clubbing, cyanosis, or edema    Disposition: Hospice house     Patient Condition at Discharge: Stable    Patient Instructions:      Medication List        CONTINUE taking these medications      albuterol sulfate  (90 Base) MCG/ACT inhaler  Commonly known as: ProAir HFA  Inhale 2 puffs into the lungs 4 times daily as needed for Shortness of Breath     Arformoterol Tartrate 15 MCG/2ML Nebu  Commonly known as: BROVANA     aspirin 81 MG EC tablet     atorvastatin 40 MG tablet  Commonly known as: LIPITOR     ipratropium-albuterol 0.5-2.5 (3) MG/3ML Soln nebulizer solution  Commonly known as: DUONEB     meloxicam 15 MG tablet  Commonly known as: MOBIC     metoprolol succinate 25 MG extended release tablet  Commonly known as: TOPROL XL     OXYGEN     pantoprazole 40 MG tablet  Commonly known as: PROTONIX     predniSONE 10 MG tablet  Commonly known as: DELTASONE     tamsulosin 0.4 MG capsule  Commonly known as: FLOMAX     Yupelri 175 MCG/3ML Soln  Generic drug: Revefenacin  Take 1 vial by nebulization Daily with lunch            STOP taking these medications      alendronate 70 MG tablet  Commonly known as: FOSAMAX            Activity: activity as tolerated  Diet: regular diet    Pt has been advised to: Follow-up with Saran Benson MD in 1 week.   Follow-up with consultants as recommended by them    Note that over 30 minutes was spent in preparing discharge papers, discussing discharge with patient, medication review, etc.    Signed:  Marigene Kehr, MD  9/6/2022

## 2022-10-09 LAB
FUNGUS (MYCOLOGY) CULTURE: ABNORMAL
FUNGUS STAIN: ABNORMAL
ORGANISM: ABNORMAL

## 2023-02-28 NOTE — PROGRESS NOTES
Physician Progress Note      Bhavin Mckeon  [facility] #:                  [id number]  :                       1940  ADMIT DATE:       2022 11:45 AM  DISCH DATE:        2022 10:19 PM  RESPONDING  PROVIDER #:        Rosemarie Gifford MD          QUERY TEXT:    Dear Dr. Sharath Land,    Pt admitted with Pneumonia and COPD. If possible, please document in the   progress notes and discharge summary if you are evaluating and /or treating   any of the following: The medical record reflects the following:  Risk Factors: Infectious process with previous infectious issues  Clinical Indicators:  in the ED is temperature was 103.8 with a heart rate of   113. ER work-up revealed elevated WBC 24,  CT chest pneumonia. -H&P Cont with   antibiotics, pansensitive pseudomonas-PN note  WBC (E9/L) 11.4 24.0H-LAB   Lactate, Sepsis (mmol/L) 1.8 2.4H-LAB Procalcitonin (ng/mL) 0.40H 0.48H-lAB  Treatment: ceftriaxone, serial labs, close pt monitoring    Thank you,  Millie CALDERON, RN  Clinical Documentation Improvement  [email or web address]  Cell Phone: [phone]  Options provided:  -- Sepsis, present on admission  -- Pneumonia without Sepsis  -- Sepsis was ruled out  -- Other - I will add my own diagnosis  -- Disagree - Not applicable / Not valid  -- Disagree - Clinically unable to determine / Unknown  -- Refer to Clinical Documentation Reviewer    PROVIDER RESPONSE TEXT:    This patient has sepsis which was present on admission. Query created by: Lizbeth Elder on 2022 1:34 PM      QUERY TEXT:    Dr. Sharath Land,    Pt admitted with pneumonia. If possible, please document in the progress notes   and discharge summary if you are evaluating and/or treating any of the   following: The medical record reflects the following:  Risk Factors: COPD, Pna  Clinical Indicators:  Pt admitted with pna, per H&P: \"Hx COPD. Unable to get   O2 above 82%.   Patient has been SOB for 2 days with decreased PO for a week.\",   Pulse ox has been labile from 90-96% on o2  Treatment: Close pt monitoring, oxygen    Thank you,  Chas CALDERON, RN  Clinical Documentation Improvement  [email or web address]  Cell Phone: [phone]  Options provided:  -- Acute respiratory failure with hypoxia  -- Acute respiratory failure with hypercapnia  -- Acute respiratory failure with hypoxia and hypercapnia  -- Acute on chronic respiratory failure with hypoxia  -- Acute on chronic respiratory failure with hypercapnia  -- Acute on chronic respiratory failure with hypoxia and hypercapnia  -- Other - I will add my own diagnosis  -- Disagree - Not applicable / Not valid  -- Disagree - Clinically unable to determine / Unknown  -- Refer to Clinical Documentation Reviewer    PROVIDER RESPONSE TEXT:    This patient is in acute respiratory failure with hypoxia.     Query created by: Alisha Plata on 9/9/2022 1:49 PM      Electronically signed by:  Alyse Virk MD 9/22/2022 8:26 AM neuropathy b/l extremities, walks with walker for balance at home currently in wheelchair at [facility]

## 2024-12-17 NOTE — PROGRESS NOTES
Call placed to access center to transfer the patient to [facility].      Dx: TIA    Dr. Yani Alfaro spoke with Dr. Campuzano Shadow       Dr. Yani Alfaro accepted patient, waiting for bed placement How Severe Is Your Eczema?: moderate

## (undated) DEVICE — SURGICAL PROCEDURE PACK BRONCH

## (undated) DEVICE — Device: Brand: MEDEX

## (undated) DEVICE — SYRINGE MED 50ML LUERLOCK TIP

## (undated) DEVICE — MASK RESP UNIV N95 4 PANEL HD STRP INDIVIDUALLY WRP LF

## (undated) DEVICE — AIRWAY PHARYNGEAL AD 10 CM INTUB

## (undated) DEVICE — SOLUTION IV IRRIG 500ML 0.9% SODIUM CHL 2F7123

## (undated) DEVICE — FORCEPS BX L100CM DIA1.8MM WRK CHN 2MM PULM S STL RAD JAW 4